# Patient Record
Sex: MALE | Race: WHITE | NOT HISPANIC OR LATINO | Employment: FULL TIME | URBAN - NONMETROPOLITAN AREA
[De-identification: names, ages, dates, MRNs, and addresses within clinical notes are randomized per-mention and may not be internally consistent; named-entity substitution may affect disease eponyms.]

---

## 2023-05-10 ENCOUNTER — OFFICE VISIT (OUTPATIENT)
Dept: FAMILY MEDICINE CLINIC | Age: 63
End: 2023-05-10
Payer: COMMERCIAL

## 2023-05-10 ENCOUNTER — LAB (OUTPATIENT)
Dept: LAB | Facility: HOSPITAL | Age: 63
End: 2023-05-10
Payer: COMMERCIAL

## 2023-05-10 VITALS
HEART RATE: 74 BPM | HEIGHT: 72 IN | BODY MASS INDEX: 34.67 KG/M2 | WEIGHT: 256 LBS | OXYGEN SATURATION: 97 % | DIASTOLIC BLOOD PRESSURE: 97 MMHG | SYSTOLIC BLOOD PRESSURE: 177 MMHG

## 2023-05-10 DIAGNOSIS — Z12.5 SCREENING FOR PROSTATE CANCER: ICD-10-CM

## 2023-05-10 DIAGNOSIS — F17.210 CIGARETTE SMOKER: ICD-10-CM

## 2023-05-10 DIAGNOSIS — E61.1 LOW SERUM IRON: ICD-10-CM

## 2023-05-10 DIAGNOSIS — Z11.59 ENCOUNTER FOR SCREENING FOR OTHER VIRAL DISEASES: ICD-10-CM

## 2023-05-10 DIAGNOSIS — R53.83 OTHER FATIGUE: ICD-10-CM

## 2023-05-10 DIAGNOSIS — Z87.891 HISTORY OF NICOTINE DEPENDENCE: ICD-10-CM

## 2023-05-10 DIAGNOSIS — Z12.11 COLON CANCER SCREENING: ICD-10-CM

## 2023-05-10 DIAGNOSIS — I10 ESSENTIAL HYPERTENSION: Primary | ICD-10-CM

## 2023-05-10 DIAGNOSIS — Z13.220 SCREENING FOR LIPOID DISORDERS: ICD-10-CM

## 2023-05-10 PROBLEM — M54.50 CHRONIC MIDLINE LOW BACK PAIN WITHOUT SCIATICA: Status: ACTIVE | Noted: 2023-05-10

## 2023-05-10 PROBLEM — G89.29 CHRONIC MIDLINE LOW BACK PAIN WITHOUT SCIATICA: Status: ACTIVE | Noted: 2023-05-10

## 2023-05-10 PROBLEM — Z90.01 HISTORY OF EYE REMOVAL: Status: ACTIVE | Noted: 2023-05-10

## 2023-05-10 LAB
ALBUMIN SERPL-MCNC: 4.3 G/DL (ref 3.5–5.2)
ALBUMIN/GLOB SERPL: 1.9 G/DL
ALP SERPL-CCNC: 71 U/L (ref 39–117)
ALT SERPL W P-5'-P-CCNC: 28 U/L (ref 1–41)
ANION GAP SERPL CALCULATED.3IONS-SCNC: 9.7 MMOL/L (ref 5–15)
AST SERPL-CCNC: 19 U/L (ref 1–40)
BASOPHILS # BLD AUTO: 0.06 10*3/MM3 (ref 0–0.2)
BASOPHILS NFR BLD AUTO: 0.6 % (ref 0–1.5)
BILIRUB SERPL-MCNC: 0.3 MG/DL (ref 0–1.2)
BUN SERPL-MCNC: 11 MG/DL (ref 8–23)
BUN/CREAT SERPL: 10.9 (ref 7–25)
CALCIUM SPEC-SCNC: 9.4 MG/DL (ref 8.6–10.5)
CHLORIDE SERPL-SCNC: 102 MMOL/L (ref 98–107)
CHOLEST SERPL-MCNC: 208 MG/DL (ref 0–200)
CO2 SERPL-SCNC: 25.3 MMOL/L (ref 22–29)
CREAT SERPL-MCNC: 1.01 MG/DL (ref 0.76–1.27)
DEPRECATED RDW RBC AUTO: 46.2 FL (ref 37–54)
EGFRCR SERPLBLD CKD-EPI 2021: 84.1 ML/MIN/1.73
EOSINOPHIL # BLD AUTO: 0.35 10*3/MM3 (ref 0–0.4)
EOSINOPHIL NFR BLD AUTO: 3.5 % (ref 0.3–6.2)
ERYTHROCYTE [DISTWIDTH] IN BLOOD BY AUTOMATED COUNT: 12.7 % (ref 12.3–15.4)
GLOBULIN UR ELPH-MCNC: 2.3 GM/DL
GLUCOSE SERPL-MCNC: 96 MG/DL (ref 65–99)
HCT VFR BLD AUTO: 46.5 % (ref 37.5–51)
HCV AB SER DONR QL: NORMAL
HDLC SERPL-MCNC: 50 MG/DL (ref 40–60)
HGB BLD-MCNC: 15.5 G/DL (ref 13–17.7)
IMM GRANULOCYTES # BLD AUTO: 0.02 10*3/MM3 (ref 0–0.05)
IMM GRANULOCYTES NFR BLD AUTO: 0.2 % (ref 0–0.5)
IRON 24H UR-MRATE: 67 MCG/DL (ref 59–158)
IRON SATN MFR SERPL: 15 % (ref 20–50)
LDLC SERPL CALC-MCNC: 132 MG/DL (ref 0–100)
LDLC/HDLC SERPL: 2.58 {RATIO}
LYMPHOCYTES # BLD AUTO: 3.01 10*3/MM3 (ref 0.7–3.1)
LYMPHOCYTES NFR BLD AUTO: 30.2 % (ref 19.6–45.3)
MCH RBC QN AUTO: 32.6 PG (ref 26.6–33)
MCHC RBC AUTO-ENTMCNC: 33.3 G/DL (ref 31.5–35.7)
MCV RBC AUTO: 97.7 FL (ref 79–97)
MONOCYTES # BLD AUTO: 0.66 10*3/MM3 (ref 0.1–0.9)
MONOCYTES NFR BLD AUTO: 6.6 % (ref 5–12)
NEUTROPHILS NFR BLD AUTO: 5.88 10*3/MM3 (ref 1.7–7)
NEUTROPHILS NFR BLD AUTO: 58.9 % (ref 42.7–76)
PLATELET # BLD AUTO: 187 10*3/MM3 (ref 140–450)
PMV BLD AUTO: 10.3 FL (ref 6–12)
POTASSIUM SERPL-SCNC: 4.2 MMOL/L (ref 3.5–5.2)
PROT SERPL-MCNC: 6.6 G/DL (ref 6–8.5)
PSA SERPL-MCNC: 3.16 NG/ML (ref 0–4)
RBC # BLD AUTO: 4.76 10*6/MM3 (ref 4.14–5.8)
SODIUM SERPL-SCNC: 137 MMOL/L (ref 136–145)
TESTOST SERPL-MCNC: 206 NG/DL (ref 193–740)
TIBC SERPL-MCNC: 453 MCG/DL (ref 298–536)
TRANSFERRIN SERPL-MCNC: 304 MG/DL (ref 200–360)
TRIGL SERPL-MCNC: 146 MG/DL (ref 0–150)
TSH SERPL DL<=0.05 MIU/L-ACNC: 2.59 UIU/ML (ref 0.27–4.2)
VLDLC SERPL-MCNC: 26 MG/DL (ref 5–40)
WBC NRBC COR # BLD: 9.98 10*3/MM3 (ref 3.4–10.8)

## 2023-05-10 PROCEDURE — 84403 ASSAY OF TOTAL TESTOSTERONE: CPT | Performed by: FAMILY MEDICINE

## 2023-05-10 PROCEDURE — 80061 LIPID PANEL: CPT | Performed by: FAMILY MEDICINE

## 2023-05-10 PROCEDURE — 83540 ASSAY OF IRON: CPT | Performed by: FAMILY MEDICINE

## 2023-05-10 PROCEDURE — 82607 VITAMIN B-12: CPT | Performed by: FAMILY MEDICINE

## 2023-05-10 PROCEDURE — 36415 COLL VENOUS BLD VENIPUNCTURE: CPT | Performed by: FAMILY MEDICINE

## 2023-05-10 PROCEDURE — G0103 PSA SCREENING: HCPCS | Performed by: FAMILY MEDICINE

## 2023-05-10 PROCEDURE — 99204 OFFICE O/P NEW MOD 45 MIN: CPT | Performed by: FAMILY MEDICINE

## 2023-05-10 PROCEDURE — 86803 HEPATITIS C AB TEST: CPT | Performed by: FAMILY MEDICINE

## 2023-05-10 PROCEDURE — 82746 ASSAY OF FOLIC ACID SERUM: CPT | Performed by: FAMILY MEDICINE

## 2023-05-10 PROCEDURE — 84466 ASSAY OF TRANSFERRIN: CPT | Performed by: FAMILY MEDICINE

## 2023-05-10 PROCEDURE — 80050 GENERAL HEALTH PANEL: CPT | Performed by: FAMILY MEDICINE

## 2023-05-10 NOTE — PROGRESS NOTES
"Chief Complaint  Establish Care and Fatigue (Requesting blood work)    Subjective          Marquise Murphy presents to [facility]  History of Present Illness  NEW PATIENT:  --BP HAS BEEN UP AND DOWN BUT DOES NOT CHECK IT REGULARLY, HAS NEVER BEEN ON BP MEDS BUT ALSO HAD NOT SEEN A DOCTOR FOR A WHILE  --ISSUES WITH FATIGUE FOR THE PAST FEW MONTHS DESPITE SLEEPING WELL, HAS NOT BEEN TOLD THAT HE SNORES.  IRON WAS LOW AT ONE TIME  --DUE FOR ROUTINE LABS, A COLONOSCOPY AND A SCREENING CHEST CT        No Known Allergies     Health Maintenance Due   Topic Date Due   • COLORECTAL CANCER SCREENING  Never done   • Pneumococcal Vaccine 0-64 (1 - PCV) Never done   • LUNG CANCER SCREENING  Never done   • HEPATITIS C SCREENING  Never done   • ANNUAL PHYSICAL  Never done        No current outpatient medications on file prior to visit.     No current facility-administered medications on file prior to visit.       Immunization History   Administered Date(s) Administered   • COVID-19 (MODERNA) 1st,2nd,3rd Dose Monovalent 07/30/2021, 08/27/2021       Review of Systems   Constitutional: Positive for fatigue. Negative for activity change, appetite change, chills and fever.   HENT: Negative for congestion, ear pain, rhinorrhea and sore throat.    Respiratory: Negative for cough and shortness of breath.    Cardiovascular: Negative for chest pain, palpitations and leg swelling.   Gastrointestinal: Negative for abdominal pain, constipation, diarrhea, nausea and vomiting.   Musculoskeletal: Positive for arthralgias. Negative for myalgias.   Neurological: Negative for headache.        Objective     /97 (BP Location: Left arm, Patient Position: Sitting)   Pulse 74   Ht 182.9 cm (72\")   Wt 116 kg (256 lb)   SpO2 97% Comment: on room air  BMI 34.72 kg/m²       Physical Exam  Vitals and nursing note reviewed.   Constitutional:       General: He is not in acute distress.     Appearance: Normal " appearance.   Cardiovascular:      Rate and Rhythm: Normal rate and regular rhythm.      Heart sounds: Normal heart sounds. No murmur heard.  Pulmonary:      Effort: Pulmonary effort is normal.      Breath sounds: Normal breath sounds.   Abdominal:      Palpations: Abdomen is soft.      Tenderness: There is no abdominal tenderness.   Musculoskeletal:      Cervical back: Neck supple.      Right lower leg: No edema.      Left lower leg: No edema.   Lymphadenopathy:      Cervical: No cervical adenopathy.   Neurological:      General: No focal deficit present.      Mental Status: He is alert.      Cranial Nerves: No cranial nerve deficit.      Coordination: Coordination normal.      Gait: Gait normal.   Psychiatric:         Mood and Affect: Mood normal.         Behavior: Behavior normal.         Result Review :                             Assessment and Plan      Diagnoses and all orders for this visit:    1. Essential hypertension (Primary)  Assessment & Plan:  Hypertension is newly identified.  Stop smoking.  Continue current medications.  Blood pressure will be reassessed at the next regular appointment   HE WILL START MONITORING AT HOME, CONSIDER STARTING MEDS, WILL REEVALUATE AT NEXT VISIT .    Orders:  -     CBC & Differential  -     Comprehensive Metabolic Panel  -     TSH Rfx On Abnormal To Free T4    2. Screening for lipoid disorders  -     Lipid Panel    3. Encounter for screening for other viral diseases  -     Hepatitis C Antibody    4. History of nicotine dependence    5. Screening for prostate cancer  -     PSA Screen    6. Colon cancer screening  -     Ambulatory Referral to General Surgery    7. Cigarette smoker  -     CT Chest Low Dose Wo; Future    8. Low serum iron  -     Iron Profile    9. Other fatigue  Assessment & Plan:  MANY POSSIBILITIES, WILL OBTAIN LABS AS NOTED AND PROCEED ACCORDINGLY.  CONSIDER SLEEP STUDY     Orders:  -     Vitamin B12 & Folate  -     Testosterone          Follow Up      Return in about 3 months (around 8/10/2023).    Patient was given instructions and counseling regarding his condition or for health maintenance advice. Please see specific information pulled into the AVS if appropriate.

## 2023-05-10 NOTE — ASSESSMENT & PLAN NOTE
Hypertension is newly identified.  Stop smoking.  Continue current medications.  Blood pressure will be reassessed at the next regular appointment   HE WILL START MONITORING AT HOME, CONSIDER STARTING MEDS, WILL REEVALUATE AT NEXT VISIT .

## 2023-05-11 ENCOUNTER — DOCUMENTATION (OUTPATIENT)
Dept: FAMILY MEDICINE CLINIC | Age: 63
End: 2023-05-11
Payer: COMMERCIAL

## 2023-05-11 LAB
FOLATE SERPL-MCNC: 15.7 NG/ML (ref 4.78–24.2)
VIT B12 BLD-MCNC: 318 PG/ML (ref 211–946)

## 2023-05-11 RX ORDER — FERROUS SULFATE 325(65) MG
325 TABLET ORAL
Qty: 90 TABLET | Refills: 1 | Status: SHIPPED | OUTPATIENT
Start: 2023-05-11

## 2023-05-19 ENCOUNTER — HOSPITAL ENCOUNTER (OUTPATIENT)
Dept: CT IMAGING | Facility: HOSPITAL | Age: 63
Discharge: HOME | End: 2023-05-19
Payer: COMMERCIAL

## 2023-05-19 DIAGNOSIS — F17.210 CIGARETTE SMOKER: ICD-10-CM

## 2023-05-19 PROCEDURE — 71271 CT THORAX LUNG CANCER SCR C-: CPT

## 2023-06-20 PROBLEM — Z12.11 COLON CANCER SCREENING: Status: ACTIVE | Noted: 2023-06-20

## 2023-08-14 ENCOUNTER — LAB (OUTPATIENT)
Dept: LAB | Facility: HOSPITAL | Age: 63
End: 2023-08-14
Payer: COMMERCIAL

## 2023-08-14 ENCOUNTER — OFFICE VISIT (OUTPATIENT)
Dept: FAMILY MEDICINE CLINIC | Age: 63
End: 2023-08-14
Payer: COMMERCIAL

## 2023-08-14 ENCOUNTER — TELEPHONE (OUTPATIENT)
Dept: FAMILY MEDICINE CLINIC | Age: 63
End: 2023-08-14

## 2023-08-14 VITALS
DIASTOLIC BLOOD PRESSURE: 97 MMHG | WEIGHT: 252.2 LBS | SYSTOLIC BLOOD PRESSURE: 160 MMHG | HEART RATE: 77 BPM | BODY MASS INDEX: 34.16 KG/M2 | HEIGHT: 72 IN

## 2023-08-14 DIAGNOSIS — E78.00 ELEVATED CHOLESTEROL: ICD-10-CM

## 2023-08-14 DIAGNOSIS — E61.1 LOW SERUM IRON: ICD-10-CM

## 2023-08-14 DIAGNOSIS — Z00.00 ANNUAL PHYSICAL EXAM: Primary | ICD-10-CM

## 2023-08-14 DIAGNOSIS — I10 ESSENTIAL HYPERTENSION: ICD-10-CM

## 2023-08-14 PROBLEM — Z12.11 COLON CANCER SCREENING: Status: RESOLVED | Noted: 2023-06-20 | Resolved: 2023-08-14

## 2023-08-14 PROBLEM — R53.83 OTHER FATIGUE: Status: RESOLVED | Noted: 2023-05-10 | Resolved: 2023-08-14

## 2023-08-14 LAB
ALBUMIN SERPL-MCNC: 4.5 G/DL (ref 3.5–5.2)
ALBUMIN/GLOB SERPL: 2.4 G/DL
ALP SERPL-CCNC: 65 U/L (ref 39–117)
ALT SERPL W P-5'-P-CCNC: 32 U/L (ref 1–41)
ANION GAP SERPL CALCULATED.3IONS-SCNC: 10 MMOL/L (ref 5–15)
AST SERPL-CCNC: 22 U/L (ref 1–40)
BILIRUB SERPL-MCNC: 0.3 MG/DL (ref 0–1.2)
BUN SERPL-MCNC: 18 MG/DL (ref 8–23)
BUN/CREAT SERPL: 18.8 (ref 7–25)
CALCIUM SPEC-SCNC: 9.2 MG/DL (ref 8.6–10.5)
CHLORIDE SERPL-SCNC: 103 MMOL/L (ref 98–107)
CHOLEST SERPL-MCNC: 224 MG/DL (ref 0–200)
CO2 SERPL-SCNC: 25 MMOL/L (ref 22–29)
CREAT SERPL-MCNC: 0.96 MG/DL (ref 0.76–1.27)
DEPRECATED RDW RBC AUTO: 47.3 FL (ref 37–54)
EGFRCR SERPLBLD CKD-EPI 2021: 89.4 ML/MIN/1.73
ERYTHROCYTE [DISTWIDTH] IN BLOOD BY AUTOMATED COUNT: 13.1 % (ref 12.3–15.4)
GLOBULIN UR ELPH-MCNC: 1.9 GM/DL
GLUCOSE SERPL-MCNC: 189 MG/DL (ref 65–99)
HCT VFR BLD AUTO: 45.7 % (ref 37.5–51)
HDLC SERPL-MCNC: 43 MG/DL (ref 40–60)
HGB BLD-MCNC: 15.5 G/DL (ref 13–17.7)
IRON 24H UR-MRATE: 142 MCG/DL (ref 59–158)
IRON SATN MFR SERPL: 37 % (ref 20–50)
LDLC SERPL CALC-MCNC: 137 MG/DL (ref 0–100)
LDLC/HDLC SERPL: 3.08 {RATIO}
MCH RBC QN AUTO: 32.6 PG (ref 26.6–33)
MCHC RBC AUTO-ENTMCNC: 33.9 G/DL (ref 31.5–35.7)
MCV RBC AUTO: 96 FL (ref 79–97)
PLATELET # BLD AUTO: 190 10*3/MM3 (ref 140–450)
PMV BLD AUTO: 11.1 FL (ref 6–12)
POTASSIUM SERPL-SCNC: 4.2 MMOL/L (ref 3.5–5.2)
PROT SERPL-MCNC: 6.4 G/DL (ref 6–8.5)
RBC # BLD AUTO: 4.76 10*6/MM3 (ref 4.14–5.8)
SODIUM SERPL-SCNC: 138 MMOL/L (ref 136–145)
TIBC SERPL-MCNC: 380 MCG/DL (ref 298–536)
TRANSFERRIN SERPL-MCNC: 255 MG/DL (ref 200–360)
TRIGL SERPL-MCNC: 243 MG/DL (ref 0–150)
VLDLC SERPL-MCNC: 44 MG/DL (ref 5–40)
WBC NRBC COR # BLD: 7.98 10*3/MM3 (ref 3.4–10.8)

## 2023-08-14 PROCEDURE — 80061 LIPID PANEL: CPT | Performed by: FAMILY MEDICINE

## 2023-08-14 PROCEDURE — 84466 ASSAY OF TRANSFERRIN: CPT | Performed by: FAMILY MEDICINE

## 2023-08-14 PROCEDURE — 80053 COMPREHEN METABOLIC PANEL: CPT | Performed by: FAMILY MEDICINE

## 2023-08-14 PROCEDURE — 99396 PREV VISIT EST AGE 40-64: CPT | Performed by: FAMILY MEDICINE

## 2023-08-14 PROCEDURE — 85027 COMPLETE CBC AUTOMATED: CPT | Performed by: FAMILY MEDICINE

## 2023-08-14 PROCEDURE — 99213 OFFICE O/P EST LOW 20 MIN: CPT | Performed by: FAMILY MEDICINE

## 2023-08-14 PROCEDURE — 36415 COLL VENOUS BLD VENIPUNCTURE: CPT | Performed by: FAMILY MEDICINE

## 2023-08-14 PROCEDURE — 83540 ASSAY OF IRON: CPT | Performed by: FAMILY MEDICINE

## 2023-08-14 RX ORDER — LISINOPRIL 10 MG/1
10 TABLET ORAL DAILY
Qty: 90 TABLET | Refills: 1 | Status: SHIPPED | OUTPATIENT
Start: 2023-08-14 | End: 2024-02-10

## 2023-08-14 NOTE — PROGRESS NOTES
Chief Complaint  Hypertension (3 months)    Subjective          Marquise Murphy presents to [facility]  History of Present Illness  --ANNUAL EXAM, LAST EXAM UNKNOWN, CONTINUES TO SMOKE, WILL HAVE A COLONOSCOPY SOON  --BP HAS REMAINED ELEVATED, BORDERLINE READINGS AT HOME  --CHOL WAS > 200, RECEIVED DIETARY ADVICE, DUE FOR A RECHECK  --IRON SAT WAS LOW, TOOK SUPPLEMENT FOR A FEW WEEKS THAN STOPPED AS HE SAW NO DIFFERENCE IN HIS LEVEL OF ENERGY      No Known Allergies     Health Maintenance Due   Topic Date Due    COLORECTAL CANCER SCREENING  Never done    Pneumococcal Vaccine 0-64 (1 - PCV) Never done    ZOSTER VACCINE (1 of 2) Never done    COVID-19 Vaccine (3 - Moderna series) 10/22/2021    ANNUAL PHYSICAL  Never done        Current Outpatient Medications on File Prior to Visit   Medication Sig    [DISCONTINUED] ferrous sulfate (FerrouSul) 325 (65 FE) MG tablet Take 1 tablet by mouth Daily With Breakfast. (Patient not taking: Reported on 8/14/2023)     No current facility-administered medications on file prior to visit.       Immunization History   Administered Date(s) Administered    COVID-19 (MODERNA) 1st,2nd,3rd Dose Monovalent 07/30/2021, 08/27/2021       Review of Systems   Constitutional:  Positive for fatigue. Negative for activity change, appetite change, chills and fever.   HENT:  Negative for congestion, ear pain, hearing loss, rhinorrhea and sore throat.    Eyes:  Negative for blurred vision and discharge.   Respiratory:  Negative for cough and shortness of breath.    Cardiovascular:  Negative for chest pain, palpitations and leg swelling.   Gastrointestinal:  Negative for abdominal pain, constipation, diarrhea, nausea and vomiting.   Genitourinary:  Negative for dysuria and hematuria.   Musculoskeletal:  Negative for arthralgias and myalgias.   Neurological:  Negative for headache.   Psychiatric/Behavioral:  Negative for depressed mood.       Objective     /97 (BP  "Location: Left arm, Patient Position: Sitting)   Pulse 77   Ht 182.9 cm (72\")   Wt 114 kg (252 lb 3.2 oz)   BMI 34.20 kg/mý       Physical Exam  Vitals and nursing note reviewed.   Constitutional:       General: He is not in acute distress.     Appearance: Normal appearance.   HENT:      Right Ear: Tympanic membrane normal.      Left Ear: Tympanic membrane normal.      Mouth/Throat:      Pharynx: Oropharynx is clear.   Eyes:      Conjunctiva/sclera: Conjunctivae normal.   Cardiovascular:      Rate and Rhythm: Normal rate and regular rhythm.      Heart sounds: Normal heart sounds. No murmur heard.  Pulmonary:      Effort: Pulmonary effort is normal.      Breath sounds: Normal breath sounds.   Abdominal:      General: Bowel sounds are normal.      Palpations: Abdomen is soft.      Tenderness: There is no abdominal tenderness.   Musculoskeletal:      Cervical back: Neck supple.      Right lower leg: No edema.      Left lower leg: No edema.   Lymphadenopathy:      Cervical: No cervical adenopathy.   Neurological:      General: No focal deficit present.      Mental Status: He is alert.      Cranial Nerves: No cranial nerve deficit.      Coordination: Coordination normal.      Gait: Gait normal.   Psychiatric:         Mood and Affect: Mood normal.         Behavior: Behavior normal.       Result Review :                             Assessment and Plan      Diagnoses and all orders for this visit:    1. Annual physical exam (Primary)  Assessment & Plan:  ADVICE GIVEN RE:  SEATBELT USE, ALCOHOL USE, HEALTHY DIET, ROUTINE EYE AND DENTAL EXAM, ROUTINE VACCINATIONS.    WILL HAVE COLONOSCOPY SOON       2. Elevated cholesterol  Assessment & Plan:  Lipid abnormalities are newly identified.  Nutritional counseling was provided.  Lipids will be reassessed in 6 months.    Orders:  -     Lipid Panel    3. Low serum iron  Assessment & Plan:  WILL REPEAT LABS AND ASK THE GENERAL SURGEON TO PERFORM AN EGD WHEN HE HAS THE COLONOSCOPY "     Orders:  -     CBC (No Diff)  -     Iron Profile    4. Essential hypertension  Assessment & Plan:  Hypertension is newly identified.  Weight loss.  Regular aerobic exercise.  Stop smoking.  Medication changes per orders.  Blood pressure will be reassessed at the next regular appointment  WILL START AN ACEI AND ADVISE CONTINUED HOME MONITORING .    Orders:  -     Comprehensive Metabolic Panel  -     lisinopril (PRINIVIL,ZESTRIL) 10 MG tablet; Take 1 tablet by mouth Daily for 180 days.  Dispense: 90 tablet; Refill: 1            Follow Up     Return in about 6 months (around 2/14/2024).    Patient was given instructions and counseling regarding his condition or for health maintenance advice. Please see specific information pulled into the AVS if appropriate.

## 2023-08-14 NOTE — TELEPHONE ENCOUNTER
----- Message from Kip Mena MD sent at 8/14/2023  8:36 AM EDT -----  PLEASE CALL THIS MAN IN A MONTH TO SEE HOW HIS BP IS DOING

## 2023-08-14 NOTE — ASSESSMENT & PLAN NOTE
Hypertension is newly identified.  Weight loss.  Regular aerobic exercise.  Stop smoking.  Medication changes per orders.  Blood pressure will be reassessed at the next regular appointment  WILL START AN ACEI AND ADVISE CONTINUED HOME MONITORING .

## 2023-08-14 NOTE — ASSESSMENT & PLAN NOTE
ADVICE GIVEN RE:  SEATBELT USE, ALCOHOL USE, HEALTHY DIET, ROUTINE EYE AND DENTAL EXAM, ROUTINE VACCINATIONS.    WILL HAVE COLONOSCOPY SOON

## 2023-08-15 DIAGNOSIS — R73.9 BLOOD GLUCOSE ELEVATED: ICD-10-CM

## 2023-08-15 DIAGNOSIS — E78.00 ELEVATED CHOLESTEROL: Primary | ICD-10-CM

## 2023-08-30 ENCOUNTER — TELEPHONE (OUTPATIENT)
Dept: FAMILY MEDICINE CLINIC | Age: 63
End: 2023-08-30

## 2023-08-30 ENCOUNTER — TELEPHONE (OUTPATIENT)
Dept: SURGERY | Facility: CLINIC | Age: 63
End: 2023-08-30
Payer: COMMERCIAL

## 2023-08-30 NOTE — TELEPHONE ENCOUNTER
"  Caller: Marquise Murphy \"Vega\"    Relationship: Self    Best call back number: [phone]     What is the best time to reach you: ANYTIME BEFORE 5PM    Who are you requesting to speak with (clinical staff, provider,  specific staff member): CLINICAL    What was the call regarding: PATIENT CALLED TO LET DR. LEON KNOW THAT HE CANCELLED HIS COLONOSCOPY FOR THE END OF NOVEMBER IN [facility] WHERE HE WAS REFERRED AND RESCHEDULED IT AT [facility] FOR THE END OF SEPTEMBER. PATIENT STATED THAT HE NEEDED TO BE CLOSER TO HOME SO THAT SOMEONE COULD TAKE HIM TO THE APPOINTMENT AND BRING HIM HOME AFTER THE COLONOSCOPY.     Is it okay if the provider responds through MyChart: YES  "

## 2023-09-14 NOTE — TELEPHONE ENCOUNTER
Pt said he is using a wrist b/p monitor.  Before he started the b/p med is b/p was running 149/85 and after medication 130/80. He has also quit smoking and is doing better.   He will stop by today and get his b/p checked here to get a idea if his wrist monitor is acuate or not.

## 2023-09-18 ENCOUNTER — CLINICAL SUPPORT (OUTPATIENT)
Dept: FAMILY MEDICINE CLINIC | Age: 63
End: 2023-09-18
Payer: COMMERCIAL

## 2023-09-18 VITALS — SYSTOLIC BLOOD PRESSURE: 122 MMHG | DIASTOLIC BLOOD PRESSURE: 78 MMHG | HEART RATE: 76 BPM

## 2023-09-18 NOTE — PROGRESS NOTES
Patient here for blood pressure check, brought in wrist cuff is 5 points off. No complains of chest pain. Kp

## 2023-11-14 ENCOUNTER — TELEPHONE (OUTPATIENT)
Dept: FAMILY MEDICINE CLINIC | Age: 63
End: 2023-11-14
Payer: COMMERCIAL

## 2024-01-02 ENCOUNTER — LAB (OUTPATIENT)
Dept: LAB | Facility: HOSPITAL | Age: 64
End: 2024-01-02
Payer: COMMERCIAL

## 2024-01-02 DIAGNOSIS — E78.00 ELEVATED CHOLESTEROL: ICD-10-CM

## 2024-01-02 DIAGNOSIS — R73.9 BLOOD GLUCOSE ELEVATED: ICD-10-CM

## 2024-01-02 LAB
ALBUMIN SERPL-MCNC: 4.7 G/DL (ref 3.5–5.2)
ALBUMIN/GLOB SERPL: 2.4 G/DL
ALP SERPL-CCNC: 68 U/L (ref 39–117)
ALT SERPL W P-5'-P-CCNC: 26 U/L (ref 1–41)
ANION GAP SERPL CALCULATED.3IONS-SCNC: 12 MMOL/L (ref 5–15)
AST SERPL-CCNC: 18 U/L (ref 1–40)
BILIRUB SERPL-MCNC: 0.4 MG/DL (ref 0–1.2)
BUN SERPL-MCNC: 18 MG/DL (ref 8–23)
BUN/CREAT SERPL: 16.8 (ref 7–25)
CALCIUM SPEC-SCNC: 9.4 MG/DL (ref 8.6–10.5)
CHLORIDE SERPL-SCNC: 101 MMOL/L (ref 98–107)
CHOLEST SERPL-MCNC: 261 MG/DL (ref 0–200)
CO2 SERPL-SCNC: 24 MMOL/L (ref 22–29)
CREAT SERPL-MCNC: 1.07 MG/DL (ref 0.76–1.27)
EGFRCR SERPLBLD CKD-EPI 2021: 78 ML/MIN/1.73
GLOBULIN UR ELPH-MCNC: 2 GM/DL
GLUCOSE SERPL-MCNC: 116 MG/DL (ref 65–99)
HBA1C MFR BLD: 6.7 % (ref 4.8–5.6)
HDLC SERPL-MCNC: 40 MG/DL (ref 40–60)
LDLC SERPL CALC-MCNC: 184 MG/DL (ref 0–100)
LDLC/HDLC SERPL: 4.54 {RATIO}
POTASSIUM SERPL-SCNC: 4.6 MMOL/L (ref 3.5–5.2)
PROT SERPL-MCNC: 6.7 G/DL (ref 6–8.5)
SODIUM SERPL-SCNC: 137 MMOL/L (ref 136–145)
TRIGL SERPL-MCNC: 198 MG/DL (ref 0–150)
VLDLC SERPL-MCNC: 37 MG/DL (ref 5–40)

## 2024-01-02 PROCEDURE — 80053 COMPREHEN METABOLIC PANEL: CPT

## 2024-01-02 PROCEDURE — 83036 HEMOGLOBIN GLYCOSYLATED A1C: CPT

## 2024-01-02 PROCEDURE — 36415 COLL VENOUS BLD VENIPUNCTURE: CPT

## 2024-01-02 PROCEDURE — 80061 LIPID PANEL: CPT

## 2024-01-03 DIAGNOSIS — I10 ESSENTIAL HYPERTENSION: Primary | ICD-10-CM

## 2024-01-03 DIAGNOSIS — R73.9 ELEVATED BLOOD SUGAR: ICD-10-CM

## 2024-01-03 DIAGNOSIS — E78.00 ELEVATED CHOLESTEROL: ICD-10-CM

## 2024-01-03 RX ORDER — ATORVASTATIN CALCIUM 20 MG/1
20 TABLET, FILM COATED ORAL DAILY
Qty: 90 TABLET | Refills: 1 | Status: SHIPPED | OUTPATIENT
Start: 2024-01-03

## 2024-02-14 ENCOUNTER — OFFICE VISIT (OUTPATIENT)
Dept: FAMILY MEDICINE CLINIC | Age: 64
End: 2024-02-14
Payer: COMMERCIAL

## 2024-02-14 VITALS
DIASTOLIC BLOOD PRESSURE: 87 MMHG | HEIGHT: 72 IN | WEIGHT: 247.8 LBS | HEART RATE: 86 BPM | SYSTOLIC BLOOD PRESSURE: 147 MMHG | TEMPERATURE: 97.5 F | BODY MASS INDEX: 33.56 KG/M2

## 2024-02-14 DIAGNOSIS — Z86.010 HISTORY OF COLON POLYPS: ICD-10-CM

## 2024-02-14 DIAGNOSIS — I10 ESSENTIAL HYPERTENSION: Primary | ICD-10-CM

## 2024-02-14 DIAGNOSIS — E78.00 HIGH CHOLESTEROL: ICD-10-CM

## 2024-02-14 DIAGNOSIS — Z86.39 HISTORY OF IRON DEFICIENCY: ICD-10-CM

## 2024-02-14 PROBLEM — Z80.0 FAMILY HISTORY OF MALIGNANT NEOPLASM OF COLON IN RELATIVE DIAGNOSED WHEN OLDER THAN 50 YEARS OF AGE: Status: ACTIVE | Noted: 2024-02-14

## 2024-02-14 PROBLEM — Z00.00 ANNUAL PHYSICAL EXAM: Status: RESOLVED | Noted: 2023-08-14 | Resolved: 2024-02-14

## 2024-02-14 PROBLEM — Z80.0 FAMILY HISTORY OF MALIGNANT NEOPLASM OF COLON IN RELATIVE DIAGNOSED WHEN OLDER THAN 50 YEARS OF AGE: Status: RESOLVED | Noted: 2024-02-14 | Resolved: 2024-02-14

## 2024-02-14 PROBLEM — Z86.0100 HISTORY OF COLON POLYPS: Status: ACTIVE | Noted: 2024-02-14

## 2024-02-14 PROCEDURE — 99214 OFFICE O/P EST MOD 30 MIN: CPT | Performed by: FAMILY MEDICINE

## 2024-03-07 ENCOUNTER — CLINICAL SUPPORT (OUTPATIENT)
Dept: FAMILY MEDICINE CLINIC | Age: 64
End: 2024-03-07
Payer: COMMERCIAL

## 2024-03-07 VITALS — HEART RATE: 87 BPM | DIASTOLIC BLOOD PRESSURE: 86 MMHG | SYSTOLIC BLOOD PRESSURE: 136 MMHG

## 2024-03-08 ENCOUNTER — OFFICE VISIT (OUTPATIENT)
Dept: FAMILY MEDICINE CLINIC | Age: 64
End: 2024-03-08
Payer: COMMERCIAL

## 2024-03-08 VITALS
OXYGEN SATURATION: 97 % | HEIGHT: 72 IN | WEIGHT: 251.4 LBS | HEART RATE: 88 BPM | SYSTOLIC BLOOD PRESSURE: 152 MMHG | DIASTOLIC BLOOD PRESSURE: 76 MMHG | BODY MASS INDEX: 34.05 KG/M2 | TEMPERATURE: 97.8 F

## 2024-03-08 DIAGNOSIS — I10 ESSENTIAL HYPERTENSION: ICD-10-CM

## 2024-03-08 DIAGNOSIS — R55 NEAR SYNCOPE: Primary | ICD-10-CM

## 2024-03-08 DIAGNOSIS — H91.93 HEARING DIFFICULTY OF BOTH EARS: ICD-10-CM

## 2024-03-08 PROBLEM — R73.03 PREDIABETES: Status: ACTIVE | Noted: 2024-03-08

## 2024-03-08 PROCEDURE — 99214 OFFICE O/P EST MOD 30 MIN: CPT | Performed by: FAMILY MEDICINE

## 2024-03-08 RX ORDER — LISINOPRIL 10 MG/1
10 TABLET ORAL DAILY
Qty: 90 TABLET | Refills: 3 | Status: SHIPPED | OUTPATIENT
Start: 2024-03-08

## 2024-03-08 NOTE — ASSESSMENT & PLAN NOTE
PROBABLY SIMPLE ORTHOSTASIS VS. VERTIGO BUT, GIVEN HIS PRIOR INJURIES AND HEARING DIFFICULTIES WILL SEND TO ENT AND GET AN MRI.  CONSIDER ECHO, HOLTER, CAROTID DOPPLER AND EEG
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
## 2024-03-13 DIAGNOSIS — H91.93 HEARING DIFFICULTY OF BOTH EARS: Primary | ICD-10-CM

## 2024-03-21 ENCOUNTER — PROCEDURE VISIT (OUTPATIENT)
Dept: OTOLARYNGOLOGY | Facility: CLINIC | Age: 64
End: 2024-03-21
Payer: COMMERCIAL

## 2024-03-21 DIAGNOSIS — H90.A22 SENSORINEURAL HEARING LOSS (SNHL) OF LEFT EAR WITH RESTRICTED HEARING OF RIGHT EAR: ICD-10-CM

## 2024-03-21 DIAGNOSIS — H93.299 IMPAIRMENT OF SPEECH DISCRIMINATION: ICD-10-CM

## 2024-03-21 DIAGNOSIS — H90.A21 SENSORINEURAL HEARING LOSS (SNHL) OF RIGHT EAR WITH RESTRICTED HEARING OF LEFT EAR: Primary | ICD-10-CM

## 2024-03-21 DIAGNOSIS — H93.13 TINNITUS, BILATERAL: ICD-10-CM

## 2024-03-21 DIAGNOSIS — H91.93 HEARING DIFFICULTY OF BOTH EARS: ICD-10-CM

## 2024-03-21 PROCEDURE — 92557 COMPREHENSIVE HEARING TEST: CPT | Performed by: AUDIOLOGIST

## 2024-03-21 PROCEDURE — 92567 TYMPANOMETRY: CPT | Performed by: AUDIOLOGIST

## 2024-03-21 NOTE — PROGRESS NOTES
AUDIOMETRIC EVALUATION      Name:  Marquise Murphy  :  1960  Age:  63 y.o.  Date of Evaluation:  2024       History:  Mr. Murphy is seen today for a hearing evaluation due to hearing loss .    Audiologic Information:  Concerns for Hearing: Yes  PETs: No  Other otologic surgical history: None  Aural Pressure/Fullness: Periodically  Otalgia: No  Otorrhea: None  Tinnitus: Yes each ear  Dizziness: Yes  Noise Exposure: Yes  Family history of hearing loss: No  Head trauma requiring hospital stay: None  Chemotherapy: No  Other significant history: No    EVALUATION:    See audiogram    RESULTS:    Otoscopic Evaluation:  Right: Unremarkable for audio testing  Left: Unremarkable for audio testing     Tympanometry (226 Hz):  Right: Type Ad  Left: Type Ad    IMPRESSIONS:  Pure tone thresholds for the right ear indicates a mild sloping to severe sensorineural hearing loss.25K to 4K hertz.  Profound loss at 8K hertz.  Very poor word recognition.  Pure tone thresholds for the left ear indicates a mild and moderate mixed hearing loss.  Fair word recognition.  Patient was counseled with regard to the findings.    Amplification needs: May benefit from hearing instruments following medical management    RECOMMENDATIONS/PLAN:  Follow-up with PCP regarding MRI.  MRI of the right internal auditory canal.  Follow-up with the ENT when necessary  Discussed results and recommendations with patient.         Chapito Flores M.S, [facility]  Licensed Audiologist

## 2024-05-01 DIAGNOSIS — R55 NEAR SYNCOPE: ICD-10-CM

## 2024-05-02 ENCOUNTER — TELEPHONE (OUTPATIENT)
Dept: FAMILY MEDICINE CLINIC | Age: 64
End: 2024-05-02
Payer: COMMERCIAL

## 2024-05-02 NOTE — TELEPHONE ENCOUNTER
[employer] message sent regarding over due labs ord by pcp 1/3/24 due 4/2/24-1st attempt

## 2024-05-04 ENCOUNTER — DOCUMENTATION (OUTPATIENT)
Dept: FAMILY MEDICINE CLINIC | Age: 64
End: 2024-05-04
Payer: COMMERCIAL

## 2024-05-04 DIAGNOSIS — H91.93 HEARING DIFFICULTY OF BOTH EARS: Primary | ICD-10-CM

## 2024-05-04 DIAGNOSIS — R55 NEAR SYNCOPE: ICD-10-CM

## 2024-05-10 ENCOUNTER — LAB (OUTPATIENT)
Dept: LAB | Facility: HOSPITAL | Age: 64
End: 2024-05-10
Payer: COMMERCIAL

## 2024-05-10 DIAGNOSIS — E78.00 ELEVATED CHOLESTEROL: ICD-10-CM

## 2024-05-10 DIAGNOSIS — R73.9 ELEVATED BLOOD SUGAR: ICD-10-CM

## 2024-05-10 DIAGNOSIS — I10 ESSENTIAL HYPERTENSION: ICD-10-CM

## 2024-05-10 LAB
ALBUMIN SERPL-MCNC: 4.1 G/DL (ref 3.5–5.2)
ALBUMIN/GLOB SERPL: 1.9 G/DL
ALP SERPL-CCNC: 65 U/L (ref 39–117)
ALT SERPL W P-5'-P-CCNC: 15 U/L (ref 1–41)
ANION GAP SERPL CALCULATED.3IONS-SCNC: 8.8 MMOL/L (ref 5–15)
AST SERPL-CCNC: 15 U/L (ref 1–40)
BILIRUB SERPL-MCNC: 0.4 MG/DL (ref 0–1.2)
BUN SERPL-MCNC: 16 MG/DL (ref 8–23)
BUN/CREAT SERPL: 16.3 (ref 7–25)
CALCIUM SPEC-SCNC: 9.1 MG/DL (ref 8.6–10.5)
CHLORIDE SERPL-SCNC: 102 MMOL/L (ref 98–107)
CHOLEST SERPL-MCNC: 147 MG/DL (ref 0–200)
CO2 SERPL-SCNC: 25.2 MMOL/L (ref 22–29)
CREAT SERPL-MCNC: 0.98 MG/DL (ref 0.76–1.27)
EGFRCR SERPLBLD CKD-EPI 2021: 86.6 ML/MIN/1.73
GLOBULIN UR ELPH-MCNC: 2.2 GM/DL
GLUCOSE SERPL-MCNC: 114 MG/DL (ref 65–99)
HBA1C MFR BLD: 6.3 % (ref 4.8–5.6)
HDLC SERPL-MCNC: 41 MG/DL (ref 40–60)
LDLC SERPL CALC-MCNC: 89 MG/DL (ref 0–100)
LDLC/HDLC SERPL: 2.14 {RATIO}
POTASSIUM SERPL-SCNC: 4.3 MMOL/L (ref 3.5–5.2)
PROT SERPL-MCNC: 6.3 G/DL (ref 6–8.5)
SODIUM SERPL-SCNC: 136 MMOL/L (ref 136–145)
TRIGL SERPL-MCNC: 92 MG/DL (ref 0–150)
VLDLC SERPL-MCNC: 17 MG/DL (ref 5–40)

## 2024-05-10 PROCEDURE — 36415 COLL VENOUS BLD VENIPUNCTURE: CPT

## 2024-05-10 PROCEDURE — 83036 HEMOGLOBIN GLYCOSYLATED A1C: CPT

## 2024-05-10 PROCEDURE — 80061 LIPID PANEL: CPT

## 2024-05-10 PROCEDURE — 80053 COMPREHEN METABOLIC PANEL: CPT

## 2024-05-16 ENCOUNTER — HOSPITAL ENCOUNTER (OUTPATIENT)
Dept: CT IMAGING | Facility: HOSPITAL | Age: 64
Discharge: HOME | End: 2024-05-16
Admitting: FAMILY MEDICINE
Payer: COMMERCIAL

## 2024-05-16 DIAGNOSIS — R55 NEAR SYNCOPE: ICD-10-CM

## 2024-05-16 DIAGNOSIS — H91.93 HEARING DIFFICULTY OF BOTH EARS: ICD-10-CM

## 2024-05-16 PROCEDURE — 25510000001 IOPAMIDOL PER 1 ML: Performed by: FAMILY MEDICINE

## 2024-05-16 PROCEDURE — 70481 CT ORBIT/EAR/FOSSA W/DYE: CPT

## 2024-05-16 RX ADMIN — IOPAMIDOL 50 ML: 755 INJECTION, SOLUTION INTRAVENOUS at 09:52

## 2024-05-20 ENCOUNTER — TELEPHONE (OUTPATIENT)
Dept: FAMILY MEDICINE CLINIC | Age: 64
End: 2024-05-20
Payer: COMMERCIAL

## 2024-05-20 DIAGNOSIS — H65.90 FLUID COLLECTION OF MIDDLE EAR: Primary | ICD-10-CM

## 2024-05-20 NOTE — TELEPHONE ENCOUNTER
Pt called and said he saw his CT scan temporal bones done on 05/16/24.  He is needing a referral for fluid on his ears to the ENT.  He has seen Dr Chapito Flores for hearing loss.  He is a audiologist.  He is wanting a referral to the ENT in [place] for the fluid on his ears.   CT Temporal Bones With Contrast (05/16/2024 09:55)

## 2024-05-22 ENCOUNTER — TELEPHONE (OUTPATIENT)
Dept: FAMILY MEDICINE CLINIC | Age: 64
End: 2024-05-22
Payer: COMMERCIAL

## 2024-05-22 ENCOUNTER — DOCUMENTATION (OUTPATIENT)
Dept: FAMILY MEDICINE CLINIC | Age: 64
End: 2024-05-22
Payer: COMMERCIAL

## 2024-05-22 DIAGNOSIS — F17.210 CIGARETTE SMOKER: Primary | ICD-10-CM

## 2024-05-22 NOTE — TELEPHONE ENCOUNTER
Pt sent a [employer] message back that its okay to set up the low dose CT scan.   CT Chest Low Dose Cancer Screening WO (05/19/2023 11:01)

## 2024-05-22 NOTE — TELEPHONE ENCOUNTER
Sent a [employer] message to inform him and make sure he is okay with Dr Mena placing a referral.

## 2024-05-23 DIAGNOSIS — H91.93 HEARING DIFFICULTY OF BOTH EARS: Primary | ICD-10-CM

## 2024-06-05 ENCOUNTER — HOSPITAL ENCOUNTER (OUTPATIENT)
Dept: CT IMAGING | Facility: HOSPITAL | Age: 64
Discharge: HOME | End: 2024-06-05
Payer: COMMERCIAL

## 2024-06-05 DIAGNOSIS — F17.210 CIGARETTE SMOKER: ICD-10-CM

## 2024-06-05 PROCEDURE — 71271 CT THORAX LUNG CANCER SCR C-: CPT

## 2024-07-17 ENCOUNTER — OFFICE VISIT (OUTPATIENT)
Dept: OTOLARYNGOLOGY | Facility: CLINIC | Age: 64
End: 2024-07-17
Payer: COMMERCIAL

## 2024-07-17 VITALS
WEIGHT: 252 LBS | BODY MASS INDEX: 34.13 KG/M2 | DIASTOLIC BLOOD PRESSURE: 80 MMHG | SYSTOLIC BLOOD PRESSURE: 145 MMHG | OXYGEN SATURATION: 93 % | HEIGHT: 72 IN

## 2024-07-17 DIAGNOSIS — H91.93 HEARING DIFFICULTY OF BOTH EARS: Primary | ICD-10-CM

## 2024-07-17 DIAGNOSIS — H74.91 MASTOID DISORDER, RIGHT: ICD-10-CM

## 2024-07-17 DIAGNOSIS — J30.1 SEASONAL ALLERGIC RHINITIS DUE TO POLLEN: ICD-10-CM

## 2024-07-18 RX ORDER — FLUTICASONE PROPIONATE 50 MCG
2 SPRAY, SUSPENSION (ML) NASAL DAILY
Qty: 16 G | Refills: 6 | Status: SHIPPED | OUTPATIENT
Start: 2024-07-18

## 2024-07-18 NOTE — PROGRESS NOTES
Patient Name: Marquise Murphy   Visit Date: 07/17/2024   Patient ID: [id number]  Provider: Epifanio Pollard MD    Sex: male  Location: [facility]   YOB: 1960  Location Address: [address] [place] [address], [address] [place] [address],?[place]?[id number]    Primary Care Provider Kip Mena MD  Location Phone: [phone]    Referring Provider: No ref. provider found        Chief Complaint  Establish Care (Consult - Fluid on ear )    Subjective    History of Present Illness  Marquise Murphy is a 63 y.o. male who presents to [facility] today as a consult from No ref. provider found.    He presents to the clinic today for evaluation of his ears and hearing, longstanding hearing loss, CT scan findings of right mastoid mucosal thickening, and allergic rhinitis.  He informs me that he has had a long history of difficulty breathing through his nose and clear drainage which she relates to allergies.  Denies any major sinus infections or purulent drainage.  He got an MRI earlier this year which showed some mucosal thickening within the right breast waited ended up having a follow-up CT scan of the temporal bones which I reviewed personally today along with him.  This shows essentially normal middle ears and mastoids aside from some mucosal inflammation on the mastoid tip on the right side.  Left side looks completely normal.  In discussing his ears he notes that he has had a long history of hearing loss and does have difficulty in day-to-day situations.  For what ever reason the right ear has been weaker for many years.  Denies any pain or pressure symptoms and has had no drainage.  He is currently getting by without hearing aids but does have some difficulties in day-to-day situations.  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    History reviewed. No pertinent past medical history.    Past Surgical History:   Procedure Laterality  "Date    COLONOSCOPY  2023    POLYPS    EYE ENUCLEATION Right     FROM TRAUMA    FACIAL FRACTURE SURGERY      TONSILLECTOMY           Current Outpatient Medications:     atorvastatin (Lipitor) 20 MG tablet, Take 1 tablet by mouth Daily., Disp: 90 tablet, Rfl: 1    lisinopril (PRINIVIL,ZESTRIL) 10 MG tablet, Take 1 tablet by mouth Daily., Disp: 90 tablet, Rfl: 3    fluticasone (FLONASE) 50 MCG/ACT nasal spray, 2 sprays into the nostril(s) as directed by provider Daily. Administer 2 sprays in each nostril for each dose., Disp: 16 g, Rfl: 6     No Known Allergies    Family History   Problem Relation Age of Onset    Diabetes Mother     Heart disease Father         Social History     Social History Narrative    Not on file       Objective     Vital Signs:   /80 (BP Location: Right arm, Patient Position: Sitting, Cuff Size: Large Adult)   Ht 182.9 cm (72.01\")   Wt 114 kg (252 lb)   SpO2 93%   BMI 34.17 kg/m²       Physical Exam    Constitutional   Appearance  : well developed, well-nourished, alert and in no acute distress, voice clear and strong    Head  Inspection  : no deformities or lesions  Face  Inspection  : No facial lesions; House-Brackmann I/VI bilaterally  Palpation  : No TMJ crepitus nor  muscle tenderness bilaterally    Eyes  Vision  Visual Fields  : Extraocular movements are intact. No spontaneous or gaze-induced nystagmus.  Conjunctivae  : clear  Sclerae  : clear  Pupils and Irises  : pupils equal, round, and reactive to light.     Ears, Nose, Mouth and Throat    Ears    External Ears  : appearance within normal limits, no lesions present  Otoscopic Examination  : Tympanic membrane appearance within normal limits bilaterally without perforations, well-aerated middle ears  Hearing  : intact to conversational voice both ears  Tunning fork testing:     :    Nose    External Nose  : appearance normal  Intranasal Exam  : mucosa mildly congested, vestibules normal, no intranasal lesions " present, septum midline, sinuses non tender to percussion  Oral Cavity    Oral Mucosa  : oral mucosa normal without pallor or cyanosis  Lips  : lip appearance normal  Teeth  : normal dentition for age  Gums  : gums pink, non-swollen, no bleeding present  Tongue  : tongue appearance normal; normal mobility  Palate  : hard palate normal, soft palate appearance normal with symmetric mobility    Throat    Oropharynx  : no inflammation or lesions present, tonsils within normal limits  Hypopharynx  : appearance within normal limits, superior epiglottis within normal limits  Larynx  : appearance within normal limits, vocal cords within normal limits, no lesions present    Neck  Inspection/Palpation  : normal appearance, no masses or tenderness, trachea midline; thyroid size normal, nontender, no nodules or masses present on palpation    Respiratory  Respiratory Effort  : breathing unlabored  Inspection of Chest  : normal appearance, no retractions    Cardiovascular  Heart  : regular rate and rhythm    Lymphatic  Neck  : no lymphadenopathy present  Supraclavicular Nodes  : no lymphadenopathy present  Preauricular Nodes  : no lymphadenopathy present    Skin and Subcutaneous Tissue  General Inspection  : Regarding face and neck - there are no rashes present, no lesions present, and no areas of discoloration    Neurologic  Cranial Nerves  : cranial nerves II-XII are grossly intact bilaterally  Gait and Station  : normal gait, able to stand without diffculty    Psychiatric  Judgement and Insight  : judgment and insight intact  Mood and Affect  : mood normal, affect appropriate              Assessment and Plan    Diagnoses and all orders for this visit:    1. Hearing difficulty of both ears (Primary)    2. Mastoid disorder, right    3. Seasonal allergic rhinitis due to pollen  -     fluticasone (FLONASE) 50 MCG/ACT nasal spray; 2 sprays into the nostril(s) as directed by provider Daily. Administer 2 sprays in each nostril for  each dose.  Dispense: 16 g; Refill: 6    Examination today revealed mildly congested nasal mucosa.  I did recommend nasal saline rinses for this, and I prescribed Flonase for him to use nightly after the rinses.  Audiogram results were discussed and I did recommend hearing aid trial for him.  As far as the CT scan findings of mastoid mucosal inflammation, since he is not symptomatic and the middle ear is free of any fluid or infection, I do think this is a chronic issue and is not consistent with acute mastoiditis.  Certainly if he has any worsening symptoms or issues such as pain behind the ear, I will be glad to reassess and see him back in the clinic.  He understands to contact me if this is the case.    Follow Up   No follow-ups on file.  Patient was given instructions and counseling regarding his condition or for health maintenance advice. Please see specific information pulled into the AVS if appropriate.

## 2024-08-23 ENCOUNTER — OFFICE VISIT (OUTPATIENT)
Dept: FAMILY MEDICINE CLINIC | Age: 64
End: 2024-08-23
Payer: COMMERCIAL

## 2024-08-23 VITALS
BODY MASS INDEX: 34.84 KG/M2 | HEART RATE: 76 BPM | DIASTOLIC BLOOD PRESSURE: 93 MMHG | SYSTOLIC BLOOD PRESSURE: 170 MMHG | HEIGHT: 72 IN | WEIGHT: 257.2 LBS | TEMPERATURE: 98.3 F

## 2024-08-23 DIAGNOSIS — H91.93 HEARING DIFFICULTY OF BOTH EARS: ICD-10-CM

## 2024-08-23 DIAGNOSIS — I10 ESSENTIAL HYPERTENSION: ICD-10-CM

## 2024-08-23 DIAGNOSIS — Z00.00 ANNUAL PHYSICAL EXAM: Primary | ICD-10-CM

## 2024-08-23 DIAGNOSIS — E78.00 HIGH CHOLESTEROL: ICD-10-CM

## 2024-08-23 DIAGNOSIS — R55 NEAR SYNCOPE: ICD-10-CM

## 2024-08-23 PROBLEM — F17.210 CIGARETTE SMOKER: Status: RESOLVED | Noted: 2023-05-10 | Resolved: 2024-08-23

## 2024-08-23 PROBLEM — J30.89 OTHER ALLERGIC RHINITIS: Status: ACTIVE | Noted: 2024-08-23

## 2024-08-23 PROCEDURE — 99396 PREV VISIT EST AGE 40-64: CPT | Performed by: FAMILY MEDICINE

## 2024-08-23 RX ORDER — ATORVASTATIN CALCIUM 20 MG/1
20 TABLET, FILM COATED ORAL DAILY
Qty: 90 TABLET | Refills: 3 | Status: SHIPPED | OUTPATIENT
Start: 2024-08-23

## 2024-08-23 RX ORDER — LISINOPRIL 10 MG/1
10 TABLET ORAL DAILY
Qty: 90 TABLET | Refills: 3 | Status: SHIPPED | OUTPATIENT
Start: 2024-08-23

## 2024-08-23 NOTE — ASSESSMENT & PLAN NOTE
MRI REPORT REVIEWED, WILL ADDRESS FURTHER AT NEXT VISIT IN TWO MONTHS.  MAY NEED FURTHER WORKUP:  ECHO, HOLTER, DOPPLER, EEG

## 2024-08-23 NOTE — ASSESSMENT & PLAN NOTE
ADVICE GIVEN RE:  SEATBELT USE, ALCOHOL USE, HEALTHY DIET, ROUTINE EYE AND DENTAL EXAM, ROUTINE VACCINATIONS.    HE WILL CONSIDER A ZOSTER VACCINATION   WILL NEED ROUTINE LABS AT HIS NEXT VISIT

## 2024-08-23 NOTE — PROGRESS NOTES
Chief Complaint  Annual Exam (Patient has been out of lisinopril and atorvastatin for about a month)    Subjective          Marquise Murphy presents to [facility]  History of Present Illness  --ANNUAL EXAM, LAST EXAM WAS ONE YEAR AGO, DOES NOT SMOKE, COLONOSCOPY WAS IN 2023  --CURRENTLY OUT OF BP AND CHOL MEDS  --HAD ONE MORE BRIEF EPISODE OF DIZZINESS A FEW DAYS AGO.  OK NOW.  MRI WAS NORMAL   --WOULD LIKE A DIFFERENT ENT OPINION REGARDING HIS HEARING DIFFICULTIES         No Known Allergies     Health Maintenance Due   Topic Date Due    TDAP/TD VACCINES (1 - Tdap) Never done    ZOSTER VACCINE (1 of 2) Never done    COVID-19 Vaccine (3 - 2023-24 season) 09/01/2023    BMI FOLLOWUP  06/20/2024    INFLUENZA VACCINE  08/01/2024        Current Outpatient Medications on File Prior to Visit   Medication Sig    fluticasone (FLONASE) 50 MCG/ACT nasal spray 2 sprays into the nostril(s) as directed by provider Daily. Administer 2 sprays in each nostril for each dose.    [DISCONTINUED] atorvastatin (Lipitor) 20 MG tablet Take 1 tablet by mouth Daily.    [DISCONTINUED] lisinopril (PRINIVIL,ZESTRIL) 10 MG tablet Take 1 tablet by mouth Daily.     No current facility-administered medications on file prior to visit.       Immunization History   Administered Date(s) Administered    COVID-19 (MODERNA) 1st,2nd,3rd Dose Monovalent 07/30/2021, 08/27/2021       Review of Systems   Constitutional:  Negative for activity change, appetite change, chills, fatigue and fever.   HENT:  Negative for congestion, ear pain, rhinorrhea and sore throat.    Eyes:  Negative for blurred vision and discharge.   Respiratory:  Negative for cough and shortness of breath.    Cardiovascular:  Negative for chest pain, palpitations and leg swelling.   Gastrointestinal:  Negative for abdominal pain, constipation, diarrhea, nausea and vomiting.   Genitourinary:  Negative for dysuria and hematuria.   Musculoskeletal:  Negative for  "arthralgias and myalgias.   Neurological:  Negative for headache.   Psychiatric/Behavioral:  Negative for depressed mood.         Objective     /93 (BP Location: Right arm, Patient Position: Sitting)   Pulse 76   Temp 98.3 °F (36.8 °C) (Oral)   Ht 182.9 cm (72\")   Wt 117 kg (257 lb 3.2 oz)   BMI 34.88 kg/m²       Physical Exam  Vitals and nursing note reviewed.   Constitutional:       General: He is not in acute distress.     Appearance: Normal appearance.   HENT:      Right Ear: Tympanic membrane normal.      Left Ear: Tympanic membrane normal.      Mouth/Throat:      Pharynx: Oropharynx is clear.   Eyes:      Conjunctiva/sclera: Conjunctivae normal.   Cardiovascular:      Rate and Rhythm: Normal rate and regular rhythm.      Heart sounds: Normal heart sounds. No murmur heard.  Pulmonary:      Effort: Pulmonary effort is normal.      Breath sounds: Normal breath sounds.   Abdominal:      General: Bowel sounds are normal.      Palpations: Abdomen is soft.      Tenderness: There is no abdominal tenderness.   Musculoskeletal:      Cervical back: Neck supple.      Right lower leg: No edema.      Left lower leg: No edema.   Lymphadenopathy:      Cervical: No cervical adenopathy.   Neurological:      General: No focal deficit present.      Mental Status: He is alert.      Cranial Nerves: No cranial nerve deficit.      Coordination: Coordination normal.      Gait: Gait normal.   Psychiatric:         Mood and Affect: Mood normal.         Behavior: Behavior normal.         Result Review :                             Assessment and Plan      Diagnoses and all orders for this visit:    1. Annual physical exam (Primary)  Assessment & Plan:  ADVICE GIVEN RE:  SEATBELT USE, ALCOHOL USE, HEALTHY DIET, ROUTINE EYE AND DENTAL EXAM, ROUTINE VACCINATIONS.    HE WILL CONSIDER A ZOSTER VACCINATION   WILL NEED ROUTINE LABS AT HIS NEXT VISIT       2. Essential hypertension  -     lisinopril (PRINIVIL,ZESTRIL) 10 MG tablet; " Take 1 tablet by mouth Daily.  Dispense: 90 tablet; Refill: 3    3. High cholesterol  -     atorvastatin (Lipitor) 20 MG tablet; Take 1 tablet by mouth Daily.  Dispense: 90 tablet; Refill: 3    4. Hearing difficulty of both ears  -     Ambulatory Referral to ENT (Otolaryngology)    5. Near syncope  Assessment & Plan:  MRI REPORT REVIEWED, WILL ADDRESS FURTHER AT NEXT VISIT IN TWO MONTHS.  MAY NEED FURTHER WORKUP:  ECHO, HOLTER, DOPPLER, EEG              Follow Up     No follow-ups on file.    Patient was given instructions and counseling regarding his condition or for health maintenance advice. Please see specific information pulled into the AVS if appropriate.

## 2024-08-29 ENCOUNTER — HOSPITAL ENCOUNTER (INPATIENT)
Facility: HOSPITAL | Age: 64
LOS: 2 days | Discharge: HOME | End: 2024-08-31
Attending: STUDENT IN AN ORGANIZED HEALTH CARE EDUCATION/TRAINING PROGRAM | Admitting: STUDENT IN AN ORGANIZED HEALTH CARE EDUCATION/TRAINING PROGRAM
Payer: COMMERCIAL

## 2024-08-29 DIAGNOSIS — I10 ESSENTIAL HYPERTENSION: ICD-10-CM

## 2024-08-29 DIAGNOSIS — Z95.5 STATUS POST INSERTION OF DRUG-ELUTING STENT INTO LEFT ANTERIOR DESCENDING (LAD) ARTERY: ICD-10-CM

## 2024-08-29 DIAGNOSIS — I21.02 STEMI INVOLVING LEFT ANTERIOR DESCENDING CORONARY ARTERY: Primary | ICD-10-CM

## 2024-08-29 LAB
ALBUMIN SERPL-MCNC: 4 G/DL (ref 3.5–5.2)
ALBUMIN/GLOB SERPL: 1.9 G/DL
ALP SERPL-CCNC: 67 U/L (ref 39–117)
ALT SERPL W P-5'-P-CCNC: 47 U/L (ref 1–41)
ANION GAP SERPL CALCULATED.3IONS-SCNC: 10 MMOL/L (ref 5–15)
AST SERPL-CCNC: 207 U/L (ref 1–40)
BILIRUB SERPL-MCNC: 0.3 MG/DL (ref 0–1.2)
BUN SERPL-MCNC: 18 MG/DL (ref 8–23)
BUN/CREAT SERPL: 14.9 (ref 7–25)
CALCIUM SPEC-SCNC: 9 MG/DL (ref 8.6–10.5)
CHLORIDE SERPL-SCNC: 104 MMOL/L (ref 98–107)
CO2 SERPL-SCNC: 22 MMOL/L (ref 22–29)
CREAT SERPL-MCNC: 1.21 MG/DL (ref 0.76–1.27)
DEPRECATED RDW RBC AUTO: 43.4 FL (ref 37–54)
EGFRCR SERPLBLD CKD-EPI 2021: 67.3 ML/MIN/1.73
ERYTHROCYTE [DISTWIDTH] IN BLOOD BY AUTOMATED COUNT: 12.2 % (ref 12.3–15.4)
GEN 5 2HR TROPONIN T REFLEX: ABNORMAL NG/L
GLOBULIN UR ELPH-MCNC: 2.1 GM/DL
GLUCOSE BLDC GLUCOMTR-MCNC: 125 MG/DL (ref 70–130)
GLUCOSE SERPL-MCNC: 145 MG/DL (ref 65–99)
HCT VFR BLD AUTO: 42.2 % (ref 37.5–51)
HGB BLD-MCNC: 14.1 G/DL (ref 13–17.7)
MCH RBC QN AUTO: 32.6 PG (ref 26.6–33)
MCHC RBC AUTO-ENTMCNC: 33.4 G/DL (ref 31.5–35.7)
MCV RBC AUTO: 97.5 FL (ref 79–97)
PLATELET # BLD AUTO: 181 10*3/MM3 (ref 140–450)
PMV BLD AUTO: 11 FL (ref 6–12)
POTASSIUM SERPL-SCNC: 4.2 MMOL/L (ref 3.5–5.2)
PROT SERPL-MCNC: 6.1 G/DL (ref 6–8.5)
RBC # BLD AUTO: 4.33 10*6/MM3 (ref 4.14–5.8)
SODIUM SERPL-SCNC: 136 MMOL/L (ref 136–145)
TROPONIN T DELTA: ABNORMAL
TROPONIN T SERPL HS-MCNC: 6577 NG/L
WBC NRBC COR # BLD AUTO: 12.48 10*3/MM3 (ref 3.4–10.8)

## 2024-08-29 PROCEDURE — 84484 ASSAY OF TROPONIN QUANT: CPT | Performed by: STUDENT IN AN ORGANIZED HEALTH CARE EDUCATION/TRAINING PROGRAM

## 2024-08-29 PROCEDURE — B240ZZ3 ULTRASONOGRAPHY OF SINGLE CORONARY ARTERY, INTRAVASCULAR: ICD-10-PCS | Performed by: STUDENT IN AN ORGANIZED HEALTH CARE EDUCATION/TRAINING PROGRAM

## 2024-08-29 PROCEDURE — 92941 PRQ TRLML REVSC TOT OCCL AMI: CPT | Performed by: STUDENT IN AN ORGANIZED HEALTH CARE EDUCATION/TRAINING PROGRAM

## 2024-08-29 PROCEDURE — B2111ZZ FLUOROSCOPY OF MULTIPLE CORONARY ARTERIES USING LOW OSMOLAR CONTRAST: ICD-10-PCS | Performed by: STUDENT IN AN ORGANIZED HEALTH CARE EDUCATION/TRAINING PROGRAM

## 2024-08-29 PROCEDURE — C1887 CATHETER, GUIDING: HCPCS | Performed by: STUDENT IN AN ORGANIZED HEALTH CARE EDUCATION/TRAINING PROGRAM

## 2024-08-29 PROCEDURE — C9606 PERC D-E COR REVASC W AMI S: HCPCS | Performed by: STUDENT IN AN ORGANIZED HEALTH CARE EDUCATION/TRAINING PROGRAM

## 2024-08-29 PROCEDURE — 4A023N7 MEASUREMENT OF CARDIAC SAMPLING AND PRESSURE, LEFT HEART, PERCUTANEOUS APPROACH: ICD-10-PCS | Performed by: STUDENT IN AN ORGANIZED HEALTH CARE EDUCATION/TRAINING PROGRAM

## 2024-08-29 PROCEDURE — 25010000002 MIDAZOLAM PER 1 MG: Performed by: STUDENT IN AN ORGANIZED HEALTH CARE EDUCATION/TRAINING PROGRAM

## 2024-08-29 PROCEDURE — 92978 ENDOLUMINL IVUS OCT C 1ST: CPT | Performed by: STUDENT IN AN ORGANIZED HEALTH CARE EDUCATION/TRAINING PROGRAM

## 2024-08-29 PROCEDURE — 85347 COAGULATION TIME ACTIVATED: CPT

## 2024-08-29 PROCEDURE — C1725 CATH, TRANSLUMIN NON-LASER: HCPCS | Performed by: STUDENT IN AN ORGANIZED HEALTH CARE EDUCATION/TRAINING PROGRAM

## 2024-08-29 PROCEDURE — 80053 COMPREHEN METABOLIC PANEL: CPT | Performed by: STUDENT IN AN ORGANIZED HEALTH CARE EDUCATION/TRAINING PROGRAM

## 2024-08-29 PROCEDURE — C1769 GUIDE WIRE: HCPCS | Performed by: STUDENT IN AN ORGANIZED HEALTH CARE EDUCATION/TRAINING PROGRAM

## 2024-08-29 PROCEDURE — 82948 REAGENT STRIP/BLOOD GLUCOSE: CPT

## 2024-08-29 PROCEDURE — 85027 COMPLETE CBC AUTOMATED: CPT | Performed by: STUDENT IN AN ORGANIZED HEALTH CARE EDUCATION/TRAINING PROGRAM

## 2024-08-29 PROCEDURE — 25010000002 NICARDIPINE 2.5 MG/ML SOLUTION: Performed by: STUDENT IN AN ORGANIZED HEALTH CARE EDUCATION/TRAINING PROGRAM

## 2024-08-29 PROCEDURE — C1894 INTRO/SHEATH, NON-LASER: HCPCS | Performed by: STUDENT IN AN ORGANIZED HEALTH CARE EDUCATION/TRAINING PROGRAM

## 2024-08-29 PROCEDURE — 027034Z DILATION OF CORONARY ARTERY, ONE ARTERY WITH DRUG-ELUTING INTRALUMINAL DEVICE, PERCUTANEOUS APPROACH: ICD-10-PCS | Performed by: STUDENT IN AN ORGANIZED HEALTH CARE EDUCATION/TRAINING PROGRAM

## 2024-08-29 PROCEDURE — 93458 L HRT ARTERY/VENTRICLE ANGIO: CPT | Performed by: STUDENT IN AN ORGANIZED HEALTH CARE EDUCATION/TRAINING PROGRAM

## 2024-08-29 PROCEDURE — C1874 STENT, COATED/COV W/DEL SYS: HCPCS | Performed by: STUDENT IN AN ORGANIZED HEALTH CARE EDUCATION/TRAINING PROGRAM

## 2024-08-29 PROCEDURE — 25010000002 HEPARIN (PORCINE) PER 1000 UNITS: Performed by: STUDENT IN AN ORGANIZED HEALTH CARE EDUCATION/TRAINING PROGRAM

## 2024-08-29 PROCEDURE — C1753 CATH, INTRAVAS ULTRASOUND: HCPCS | Performed by: STUDENT IN AN ORGANIZED HEALTH CARE EDUCATION/TRAINING PROGRAM

## 2024-08-29 PROCEDURE — C1760 CLOSURE DEV, VASC: HCPCS | Performed by: STUDENT IN AN ORGANIZED HEALTH CARE EDUCATION/TRAINING PROGRAM

## 2024-08-29 PROCEDURE — B2151ZZ FLUOROSCOPY OF LEFT HEART USING LOW OSMOLAR CONTRAST: ICD-10-PCS | Performed by: STUDENT IN AN ORGANIZED HEALTH CARE EDUCATION/TRAINING PROGRAM

## 2024-08-29 PROCEDURE — 25010000002 FENTANYL CITRATE (PF) 50 MCG/ML SOLUTION: Performed by: STUDENT IN AN ORGANIZED HEALTH CARE EDUCATION/TRAINING PROGRAM

## 2024-08-29 PROCEDURE — 99223 1ST HOSP IP/OBS HIGH 75: CPT | Performed by: STUDENT IN AN ORGANIZED HEALTH CARE EDUCATION/TRAINING PROGRAM

## 2024-08-29 PROCEDURE — 25510000001 IOPAMIDOL PER 1 ML: Performed by: STUDENT IN AN ORGANIZED HEALTH CARE EDUCATION/TRAINING PROGRAM

## 2024-08-29 DEVICE — XIENCE SKYPOINT™ EVEROLIMUS ELUTING CORONARY STENT SYSTEM 3.00 MM X 38 MM / RAPID-EXCHANGE
Type: IMPLANTABLE DEVICE | Site: CORONARY | Status: FUNCTIONAL
Brand: XIENCE SKYPOINT™

## 2024-08-29 RX ORDER — ASPIRIN 81 MG/1
81 TABLET ORAL DAILY
Status: DISCONTINUED | OUTPATIENT
Start: 2024-08-29 | End: 2024-08-31 | Stop reason: HOSPADM

## 2024-08-29 RX ORDER — OXYCODONE AND ACETAMINOPHEN 5; 325 MG/1; MG/1
1 TABLET ORAL EVERY 6 HOURS PRN
Status: DISCONTINUED | OUTPATIENT
Start: 2024-08-29 | End: 2024-08-30

## 2024-08-29 RX ORDER — LISINOPRIL 10 MG/1
10 TABLET ORAL DAILY
Status: DISCONTINUED | OUTPATIENT
Start: 2024-08-29 | End: 2024-08-31 | Stop reason: HOSPADM

## 2024-08-29 RX ORDER — ATORVASTATIN CALCIUM 20 MG/1
40 TABLET, FILM COATED ORAL NIGHTLY
Status: DISCONTINUED | OUTPATIENT
Start: 2024-08-29 | End: 2024-08-31 | Stop reason: HOSPADM

## 2024-08-29 RX ORDER — METOPROLOL SUCCINATE 25 MG/1
25 TABLET, EXTENDED RELEASE ORAL DAILY
Status: DISCONTINUED | OUTPATIENT
Start: 2024-08-29 | End: 2024-08-31 | Stop reason: HOSPADM

## 2024-08-29 RX ORDER — IOPAMIDOL 755 MG/ML
INJECTION, SOLUTION INTRAVASCULAR
Status: DISCONTINUED | OUTPATIENT
Start: 2024-08-29 | End: 2024-08-29 | Stop reason: HOSPADM

## 2024-08-29 RX ORDER — HEPARIN SODIUM 1000 [USP'U]/ML
INJECTION, SOLUTION INTRAVENOUS; SUBCUTANEOUS
Status: DISCONTINUED | OUTPATIENT
Start: 2024-08-29 | End: 2024-08-29 | Stop reason: HOSPADM

## 2024-08-29 RX ORDER — NITROGLYCERIN 0.4 MG/1
0.4 TABLET SUBLINGUAL
Status: DISCONTINUED | OUTPATIENT
Start: 2024-08-29 | End: 2024-08-31 | Stop reason: HOSPADM

## 2024-08-29 RX ORDER — ACETAMINOPHEN 325 MG/1
650 TABLET ORAL EVERY 4 HOURS PRN
Status: DISCONTINUED | OUTPATIENT
Start: 2024-08-29 | End: 2024-08-31 | Stop reason: HOSPADM

## 2024-08-29 RX ORDER — NICARDIPINE HYDROCHLORIDE 2.5 MG/ML
INJECTION INTRAVENOUS
Status: DISCONTINUED | OUTPATIENT
Start: 2024-08-29 | End: 2024-08-29 | Stop reason: HOSPADM

## 2024-08-29 RX ORDER — FENTANYL CITRATE 50 UG/ML
INJECTION, SOLUTION INTRAMUSCULAR; INTRAVENOUS
Status: DISCONTINUED | OUTPATIENT
Start: 2024-08-29 | End: 2024-08-29 | Stop reason: HOSPADM

## 2024-08-29 RX ORDER — VERAPAMIL HYDROCHLORIDE 2.5 MG/ML
INJECTION, SOLUTION INTRAVENOUS
Status: DISCONTINUED | OUTPATIENT
Start: 2024-08-29 | End: 2024-08-29 | Stop reason: HOSPADM

## 2024-08-29 RX ORDER — LIDOCAINE HYDROCHLORIDE 20 MG/ML
INJECTION, SOLUTION INFILTRATION; PERINEURAL
Status: DISCONTINUED | OUTPATIENT
Start: 2024-08-29 | End: 2024-08-29 | Stop reason: HOSPADM

## 2024-08-29 RX ORDER — MIDAZOLAM HYDROCHLORIDE 1 MG/ML
INJECTION INTRAMUSCULAR; INTRAVENOUS
Status: DISCONTINUED | OUTPATIENT
Start: 2024-08-29 | End: 2024-08-29 | Stop reason: HOSPADM

## 2024-08-29 RX ADMIN — METOPROLOL SUCCINATE 25 MG: 25 TABLET, EXTENDED RELEASE ORAL at 13:40

## 2024-08-29 RX ADMIN — OXYCODONE AND ACETAMINOPHEN 1 TABLET: 5; 325 TABLET ORAL at 15:15

## 2024-08-29 RX ADMIN — TICAGRELOR 90 MG: 90 TABLET ORAL at 19:46

## 2024-08-29 RX ADMIN — ASPIRIN 81 MG: 81 TABLET, COATED ORAL at 12:32

## 2024-08-29 RX ADMIN — ATORVASTATIN CALCIUM 40 MG: 10 TABLET, FILM COATED ORAL at 19:44

## 2024-08-29 RX ADMIN — OXYCODONE AND ACETAMINOPHEN 1 TABLET: 5; 325 TABLET ORAL at 21:15

## 2024-08-29 RX ADMIN — LISINOPRIL 10 MG: 10 TABLET ORAL at 12:32

## 2024-08-29 RX ADMIN — ACETAMINOPHEN 325MG 650 MG: 325 TABLET ORAL at 14:12

## 2024-08-29 NOTE — Clinical Note
Addended by: CHRIS MCBRIDE on: 1/22/2024 01:05 PM     Modules accepted: Orders     Inserted under fluoro.

## 2024-08-29 NOTE — PLAN OF CARE
Pt admitted to CICU today s/p PCI , x1 stent placed to LAD, Aox4, on room air, complaints of back pain related to bed rest, now that he is sitting up he feels much relief. TR band off, site clean,dry,intact and soft, right fem site scant oozing, soft to touch. PRN pain meds given. Family updated.     Goal Outcome Evaluation:  Plan of Care Reviewed With: patient, family    Progress: improving

## 2024-08-29 NOTE — H&P
Date of Hospital Visit: 24  Encounter Provider: Dillon Collazo MD  Place of Service: [facility]  Patient Name: Marquise Murphy  :1960  [id number]    Chief complaint: AWSTEMI    History of Present Illness:  63-year-old man with hypertension, hyperlipidemia, hearing loss who presented with severe chest pain, found to have anterior ST elevations on EKG.  He was taken emergently to the Cath Lab which revealed a 100% occlusion of the proximal LAD.  He received successful IVUS guided PCI with a 3.0 x 38mm Xience Skypoint drug-eluting stent (postdilated proximally with a 4.0 mm NC and distally with a 3.0 mm NC)    Past Medical History:   Diagnosis Date    Asthma FOR YEARS    HARD TO BREATH THRU NOSE    HL (hearing loss) FOR YEARS IN RIGHT EAR    GOES IN AND OUT    Visual impairment        Past Surgical History:   Procedure Laterality Date    COLONOSCOPY      POLYPS    EYE ENUCLEATION Right     FROM TRAUMA    FACIAL FRACTURE SURGERY      TONSILLECTOMY         Medications Prior to Admission   Medication Sig Dispense Refill Last Dose    atorvastatin (Lipitor) 20 MG tablet Take 1 tablet by mouth Daily. 90 tablet 3 Past Month    fluticasone (FLONASE) 50 MCG/ACT nasal spray 2 sprays into the nostril(s) as directed by provider Daily. Administer 2 sprays in each nostril for each dose. 16 g 6 More than a month    lisinopril (PRINIVIL,ZESTRIL) 10 MG tablet Take 1 tablet by mouth Daily. 90 tablet 3 More than a month       Current Meds  No current facility-administered medications on file prior to encounter.     Current Outpatient Medications on File Prior to Encounter   Medication Sig Dispense Refill    atorvastatin (Lipitor) 20 MG tablet Take 1 tablet by mouth Daily. 90 tablet 3    fluticasone (FLONASE) 50 MCG/ACT nasal spray 2 sprays into the nostril(s) as directed by provider Daily. Administer 2 sprays in each nostril for each dose. 16 g 6    lisinopril (PRINIVIL,ZESTRIL) 10  MG tablet Take 1 tablet by mouth Daily. 90 tablet 3       Social History     Socioeconomic History    Marital status: Single   Tobacco Use    Smoking status: Former     Current packs/day: 0.00     Average packs/day: 0.5 packs/day for 42.0 years (21.0 ttl pk-yrs)     Types: Cigarettes     Start date: 10/1/1981     Quit date: 10/2023     Years since quittin.9     Passive exposure: Past    Smokeless tobacco: Never   Vaping Use    Vaping status: Never Used   Substance and Sexual Activity    Alcohol use: Yes    Drug use: Never    Sexual activity: Not Currently     Partners: Female       Family Hx: Non-contributory    REVIEW OF SYSTEMS:   ROS was performed and is negative except as outlined in HPI     REVIEW OF SYSTEMS:   CONSTITUTIONAL: No weight loss, fever, chills, weakness or fatigue.   HEENT: Eyes: No visual loss, blurred vision, double vision or yellow sclerae. Ears, Nose, Throat: No hearing loss, sneezing, congestion, runny nose or sore throat.   SKIN: No rash or itching.     RESPIRATORY: No shortness of breath, hemoptysis, cough or sputum.   GASTROINTESTINAL: No anorexia, nausea, vomiting or diarrhea. No abdominal pain, bright red blood per rectum or melena.  NEUROLOGICAL: No headache, dizziness, syncope, paralysis, numbness or tingling in the extremities.  MUSCULOSKELETAL: No muscle, back pain, joint pain or stiffness.   HEMATOLOGIC: No anemia, bleeding or bruising.   LYMPHATICS: No enlarged nodes.  PSYCHIATRIC: No history of depression, anxiety, hallucinations.   ENDOCRINOLOGIC: No reports of sweating, cold or heat intolerance. No polyuria or polydipsia.        Objective:     Vitals:    24 1300 24 1345 24 1400 24 1500   BP: (!) 138/101 148/89 138/84 151/75   Pulse: 86 91 84 88   Resp:       SpO2: 97% 96% 95% 96%     There is no height or weight on file to calculate BMI.      GEN: no distress, alert and oriented  HEENT: NACT, EOMI, moist mucous membranes  Lungs: CTAB, no wheezes, rales  or rhonchi  CV: normal rate, regular rhythm, normal S1, S2, no murmurs, +2 radial pulses b/l, no carotid bruit  Abdomen: soft, nontender, nondistended, NABS  Extremities: no edema  Skin: no rash, warm, dry  Heme/Lymph: no bruising  Psych: organized thought, normal behavior and affect    Results from last 7 days   Lab Units 08/29/24  1138   SODIUM mmol/L 136   POTASSIUM mmol/L 4.2   CHLORIDE mmol/L 104   CO2 mmol/L 22.0   BUN mg/dL 18   CREATININE mg/dL 1.21   CALCIUM mg/dL 9.0   BILIRUBIN mg/dL 0.3   ALK PHOS U/L 67   ALT (SGPT) U/L 47*   AST (SGOT) U/L 207*   GLUCOSE mg/dL 145*     Results from last 7 days   Lab Units 08/29/24  1138   HSTROP T ng/L 6,577*     @LABRCNTbnp@  Results from last 7 days   Lab Units 08/29/24  1138   WBC 10*3/mm3 12.48*   HEMOGLOBIN g/dL 14.1   HEMATOCRIT % 42.2   PLATELETS 10*3/mm3 181             @LABRCNTIP(chol,trig,hdl,ldl)      I personally viewed and interpreted the patient's EKG/Telemetry data      Assessment:  Active Hospital Problems    Diagnosis  POA    **STEMI involving left anterior descending coronary artery [I21.02]  Yes      Resolved Hospital Problems   No resolved problems to display.       Plan:  -Aspirin 81 mg daily, ticagrelor 90 mg twice daily  -Echocardiogram in 48 hours to rule out LV thrombus  -Start Toprol 25 mg daily  -Continue home lisinopril  -Increase atorvastatin to 40 mg        Dillon Cortez MD, [facility]  08/29/24  15:37 EDT.

## 2024-08-29 NOTE — Clinical Note
First balloon inflation max pressure = 16 dominik. First balloon inflation duration = 5 seconds. Second inflation of balloon - Max pressure = 16 dominki. 2nd Inflation of balloon - Duration = 5 seconds. 2nd inflation was done at 11:38 EDT.

## 2024-08-29 NOTE — Clinical Note
First balloon inflation max pressure = 14 dominik. First balloon inflation duration = 6 seconds. Second inflation of balloon - Max pressure = 8 dominik. 2nd Inflation of balloon - Duration = 6 seconds. 2nd inflation was done at 11:29 EDT.

## 2024-08-29 NOTE — Clinical Note
First balloon inflation max pressure = 16 dominik. First balloon inflation duration = 5 seconds. Second inflation of balloon - Max pressure = 18 dominik. 2nd Inflation of balloon - Duration = 6 seconds. 2nd inflation was done at 11:15 EDT.

## 2024-08-29 NOTE — Clinical Note
First balloon inflation max pressure = 12 dominik. First balloon inflation duration = 8 seconds. Second inflation of balloon - Max pressure = 12 dominik. 2nd Inflation of balloon - Duration = 8 seconds. 2nd inflation was done at 11:01 EDT. Third inflation of balloon - Max pressure = 12 dominik. 3rd Inflation of balloon - Duration = 8 seconds. 3rd inflation was done at 11:02 EDT.

## 2024-08-29 NOTE — Clinical Note
Hemostasis started on the right femoral artery. StarClose SE was used in achieving hemostasis. Closure device deployed in the vessel. Hemostasis achieved successfully. Closure device additional comment: 4x4's and tegaderm applied

## 2024-08-29 NOTE — Clinical Note
First balloon inflation max pressure = 18 dominik. First balloon inflation duration = 6 seconds. Second inflation of balloon - Max pressure = 18 dominik. 2nd Inflation of balloon - Duration = 6 seconds. 2nd inflation was done at 11:31 EDT.

## 2024-08-30 LAB
ACT BLD: 244 SECONDS (ref 82–152)
ACT BLD: 262 SECONDS (ref 82–152)
ACT BLD: 293 SECONDS (ref 82–152)
ACT BLD: 299 SECONDS (ref 82–152)
ANION GAP SERPL CALCULATED.3IONS-SCNC: 11.3 MMOL/L (ref 5–15)
BUN SERPL-MCNC: 14 MG/DL (ref 8–23)
BUN/CREAT SERPL: 15.6 (ref 7–25)
CALCIUM SPEC-SCNC: 9.2 MG/DL (ref 8.6–10.5)
CHLORIDE SERPL-SCNC: 100 MMOL/L (ref 98–107)
CHOLEST SERPL-MCNC: 163 MG/DL (ref 0–200)
CO2 SERPL-SCNC: 22.7 MMOL/L (ref 22–29)
CREAT SERPL-MCNC: 0.9 MG/DL (ref 0.76–1.27)
DEPRECATED RDW RBC AUTO: 44.8 FL (ref 37–54)
EGFRCR SERPLBLD CKD-EPI 2021: 96 ML/MIN/1.73
ERYTHROCYTE [DISTWIDTH] IN BLOOD BY AUTOMATED COUNT: 12.4 % (ref 12.3–15.4)
GLUCOSE SERPL-MCNC: 138 MG/DL (ref 65–99)
HBA1C MFR BLD: 6.3 % (ref 4.8–5.6)
HCT VFR BLD AUTO: 42.5 % (ref 37.5–51)
HDLC SERPL-MCNC: 37 MG/DL (ref 40–60)
HGB BLD-MCNC: 14.4 G/DL (ref 13–17.7)
LDLC SERPL CALC-MCNC: 99 MG/DL (ref 0–100)
LDLC/HDLC SERPL: 2.57 {RATIO}
MCH RBC QN AUTO: 33.3 PG (ref 26.6–33)
MCHC RBC AUTO-ENTMCNC: 33.9 G/DL (ref 31.5–35.7)
MCV RBC AUTO: 98.2 FL (ref 79–97)
PLATELET # BLD AUTO: 172 10*3/MM3 (ref 140–450)
PMV BLD AUTO: 10.9 FL (ref 6–12)
POTASSIUM SERPL-SCNC: 4.2 MMOL/L (ref 3.5–5.2)
QT INTERVAL: 350 MS
QTC INTERVAL: 421 MS
RBC # BLD AUTO: 4.33 10*6/MM3 (ref 4.14–5.8)
SODIUM SERPL-SCNC: 134 MMOL/L (ref 136–145)
TRIGL SERPL-MCNC: 155 MG/DL (ref 0–150)
VLDLC SERPL-MCNC: 27 MG/DL (ref 5–40)
WBC NRBC COR # BLD AUTO: 12.55 10*3/MM3 (ref 3.4–10.8)

## 2024-08-30 PROCEDURE — 93005 ELECTROCARDIOGRAM TRACING: CPT | Performed by: STUDENT IN AN ORGANIZED HEALTH CARE EDUCATION/TRAINING PROGRAM

## 2024-08-30 PROCEDURE — 93010 ELECTROCARDIOGRAM REPORT: CPT | Performed by: INTERNAL MEDICINE

## 2024-08-30 PROCEDURE — 83036 HEMOGLOBIN GLYCOSYLATED A1C: CPT | Performed by: STUDENT IN AN ORGANIZED HEALTH CARE EDUCATION/TRAINING PROGRAM

## 2024-08-30 PROCEDURE — 99232 SBSQ HOSP IP/OBS MODERATE 35: CPT | Performed by: STUDENT IN AN ORGANIZED HEALTH CARE EDUCATION/TRAINING PROGRAM

## 2024-08-30 PROCEDURE — 80048 BASIC METABOLIC PNL TOTAL CA: CPT | Performed by: STUDENT IN AN ORGANIZED HEALTH CARE EDUCATION/TRAINING PROGRAM

## 2024-08-30 PROCEDURE — 80061 LIPID PANEL: CPT | Performed by: STUDENT IN AN ORGANIZED HEALTH CARE EDUCATION/TRAINING PROGRAM

## 2024-08-30 PROCEDURE — 85027 COMPLETE CBC AUTOMATED: CPT | Performed by: STUDENT IN AN ORGANIZED HEALTH CARE EDUCATION/TRAINING PROGRAM

## 2024-08-30 RX ORDER — OXYCODONE AND ACETAMINOPHEN 5; 325 MG/1; MG/1
1 TABLET ORAL EVERY 4 HOURS PRN
Status: DISCONTINUED | OUTPATIENT
Start: 2024-08-30 | End: 2024-08-31 | Stop reason: HOSPADM

## 2024-08-30 RX ADMIN — OXYCODONE AND ACETAMINOPHEN 1 TABLET: 5; 325 TABLET ORAL at 08:40

## 2024-08-30 RX ADMIN — ASPIRIN 81 MG: 81 TABLET, COATED ORAL at 08:20

## 2024-08-30 RX ADMIN — ATORVASTATIN CALCIUM 40 MG: 10 TABLET, FILM COATED ORAL at 20:51

## 2024-08-30 RX ADMIN — TICAGRELOR 90 MG: 90 TABLET ORAL at 20:51

## 2024-08-30 RX ADMIN — METOPROLOL SUCCINATE 25 MG: 25 TABLET, EXTENDED RELEASE ORAL at 08:20

## 2024-08-30 RX ADMIN — OXYCODONE AND ACETAMINOPHEN 1 TABLET: 5; 325 TABLET ORAL at 03:58

## 2024-08-30 RX ADMIN — LISINOPRIL 10 MG: 10 TABLET ORAL at 08:20

## 2024-08-30 RX ADMIN — TICAGRELOR 90 MG: 90 TABLET ORAL at 08:20

## 2024-08-30 NOTE — PROGRESS NOTES
Patient Name: Marquise Murphy  :1960  63 y.o.      Patient Care Team:  Kip Mena MD as PCP - General (Family Medicine)    Chief Complaint:   Anterior STEMI    Interval History:   Subjective dyspnea this morning.    Objective   Vital Signs  Temp:  [97.8 °F (36.6 °C)-98.1 °F (36.7 °C)] 97.8 °F (36.6 °C)  Heart Rate:  [79-92] 79  Resp:  [11-18] 14  BP: ()/() 98/59    Intake/Output Summary (Last 24 hours) at 2024 0926  Last data filed at 2024 0900  Gross per 24 hour   Intake 760 ml   Output 2100 ml   Net -1340 ml         GEN: no distress, alert and oriented  HEENT: NACT, EOMI, moist mucous membranes  Lungs: CTAB, no wheezes, rales or rhonchi  CV: normal rate, regular rhythm, normal S1, S2, no murmurs, radial artery access site CDI, no hematoma, pulse 2+. Right common femoral artery access site CDI, no hematoma, pulse 2+  Abdomen: soft, nontender, nondistended, NABS  Extremities: no edema  Skin: no rash, warm, dry  Heme/Lymph: no bruising  Psych: organized thought, normal behavior and affect    Results Review:    Results from last 7 days   Lab Units 24  0411   SODIUM mmol/L 134*   POTASSIUM mmol/L 4.2   CHLORIDE mmol/L 100   CO2 mmol/L 22.7   BUN mg/dL 14   CREATININE mg/dL 0.90   GLUCOSE mg/dL 138*   CALCIUM mg/dL 9.2     Results from last 7 days   Lab Units 24  1601 24  1138   HSTROP T ng/L >10,000* 6,577*     Results from last 7 days   Lab Units 24  0411   WBC 10*3/mm3 12.55*   HEMOGLOBIN g/dL 14.4   HEMATOCRIT % 42.5   PLATELETS 10*3/mm3 172             Results from last 7 days   Lab Units 24  0411   CHOLESTEROL mg/dL 163   TRIGLYCERIDES mg/dL 155*   HDL CHOL mg/dL 37*   LDL CHOL mg/dL 99               Medication Review:   aspirin, 81 mg, Oral, Daily  atorvastatin, 40 mg, Oral, Nightly  lisinopril, 10 mg, Oral, Daily  metoprolol succinate XL, 25 mg, Oral, Daily  ticagrelor, 90 mg, Oral, BID              Assessment & Plan   #Anterior  STEMI  #HTN  #HLD    63-year-old man with hypertension, hyperlipidemia, hearing loss who presented with severe chest pain, found to have anterior ST elevations on EKG. He was taken emergently to the Cath Lab which revealed a 100% occlusion of the proximal LAD. He received successful IVUS guided PCI with a 3.0 x 38mm Xience Skypoint drug-eluting stent (postdilated proximally with a 4.0 mm NC and distally with a 3.0 mm NC.) Left ventriculography revealed mid to distal anterior wall and apical hypokinesis.    His subjective dyspnea is likely secondary to the ticagrelor.  I explained that this will likely subside in the next coming weeks that ideally we would continue this if he is to remain on DAPT.  We will obtain echocardiogram with [facility] tomorrow.  If this were to reveal an LV thrombus, his Brilinta will need to be switched to Plavix and he can continue Plavix and Eliquis without aspirin.  If he does not have an LV thrombus, would continue aspirin and Brilinta for at least 1 year.    - Continue atorvastatin 40 mg daily, Toprol 25 mg daily, lisinopril 10 mg daily.  - DC home tomorrow after echocardiogram    Dillon Cortez MD, FACC, [facility]  08/30/24  09:26 EDT

## 2024-08-30 NOTE — PAYOR COMM NOTE
"Marquise Godoy \"Vega\" (63 y.o. Male)                          ATTENTION INITIAL CLINICALS AUTH PENDING [id number]                        REPLY TO [facility] DEPT  [phone] OR CALL   SERENITY WISE LPJASMIN                    Date of Birth   1960    Social Security Number       Address   [address] [place] [address]    Home Phone   [phone]    MRN   [id number]       Jehovah's witness   None    Marital Status   Single                            Admission Date   8/29/24    Admission Type   Emergency    Admitting Provider   Dillon Collazo MD    Attending Provider   Dillon Collazo MD    Department, Room/Bed   [facility] CARDIAC INTENSIVE CARE, 3007/1       Discharge Date       Discharge Disposition       Discharge Destination                                 Attending Provider: Dillon Collazo MD    Allergies: No Known Allergies    Isolation: None   Infection: None   Code Status: CPR    Ht: 182.9 cm (72\")   Wt: 117 kg (257 lb 3.2 oz)    Admission Cmt: None   Principal Problem: STEMI involving left anterior descending coronary artery [I21.02]                   Active Insurance as of 8/29/2024       Primary Coverage       Payor Plan Insurance Group Employer/Plan Group    ANTHEM BLUE CROSS ANTHEM BLUE CROSS BLUE SHIELD PPO [id number]       Payor Plan Address Payor Plan Phone Number Payor Plan Fax Number Effective Dates    [address] [phone]  1/1/2020 - None Entered    [place] [address]         Subscriber Name Subscriber Birth Date Member ID       MARQUISE GODOY 1960 [id number]                     Emergency Contacts        (Rel.) Home Phone Work Phone Mobile Phone    Geri Marley (Sister) -- -- [phone]                 History & Physical        Dillon Collazo MD at 08/29/24 1597          Date of Hospital Visit: 08/29/24  Encounter Provider: Dillon Collazo MD  Place of Service: [facility] CARDIOLOGY  Patient Name: Marquise MARTINEZ" Jeffrey  :1960  [id number]    Chief complaint: AWSTEMI    History of Present Illness:  63-year-old man with hypertension, hyperlipidemia, hearing loss who presented with severe chest pain, found to have anterior ST elevations on EKG.  He was taken emergently to the Cath Lab which revealed a 100% occlusion of the proximal LAD.  He received successful IVUS guided PCI with a 3.0 x 38mm Xience Skypoint drug-eluting stent (postdilated proximally with a 4.0 mm NC and distally with a 3.0 mm NC)    Past Medical History:   Diagnosis Date    Asthma FOR YEARS    HARD TO BREATH THRU NOSE    HL (hearing loss) FOR YEARS IN RIGHT EAR    GOES IN AND OUT    Visual impairment        Past Surgical History:   Procedure Laterality Date    COLONOSCOPY      POLYPS    EYE ENUCLEATION Right     FROM TRAUMA    FACIAL FRACTURE SURGERY      TONSILLECTOMY         Medications Prior to Admission   Medication Sig Dispense Refill Last Dose    atorvastatin (Lipitor) 20 MG tablet Take 1 tablet by mouth Daily. 90 tablet 3 Past Month    fluticasone (FLONASE) 50 MCG/ACT nasal spray 2 sprays into the nostril(s) as directed by provider Daily. Administer 2 sprays in each nostril for each dose. 16 g 6 More than a month    lisinopril (PRINIVIL,ZESTRIL) 10 MG tablet Take 1 tablet by mouth Daily. 90 tablet 3 More than a month       Current Meds  No current facility-administered medications on file prior to encounter.     Current Outpatient Medications on File Prior to Encounter   Medication Sig Dispense Refill    atorvastatin (Lipitor) 20 MG tablet Take 1 tablet by mouth Daily. 90 tablet 3    fluticasone (FLONASE) 50 MCG/ACT nasal spray 2 sprays into the nostril(s) as directed by provider Daily. Administer 2 sprays in each nostril for each dose. 16 g 6    lisinopril (PRINIVIL,ZESTRIL) 10 MG tablet Take 1 tablet by mouth Daily. 90 tablet 3       Social History     Socioeconomic History    Marital status: Single   Tobacco Use    Smoking status:  Former     Current packs/day: 0.00     Average packs/day: 0.5 packs/day for 42.0 years (21.0 ttl pk-yrs)     Types: Cigarettes     Start date: 10/1/1981     Quit date: 10/2023     Years since quittin.9     Passive exposure: Past    Smokeless tobacco: Never   Vaping Use    Vaping status: Never Used   Substance and Sexual Activity    Alcohol use: Yes    Drug use: Never    Sexual activity: Not Currently     Partners: Female       Family Hx: Non-contributory    REVIEW OF SYSTEMS:   ROS was performed and is negative except as outlined in HPI     REVIEW OF SYSTEMS:   CONSTITUTIONAL: No weight loss, fever, chills, weakness or fatigue.   HEENT: Eyes: No visual loss, blurred vision, double vision or yellow sclerae. Ears, Nose, Throat: No hearing loss, sneezing, congestion, runny nose or sore throat.   SKIN: No rash or itching.     RESPIRATORY: No shortness of breath, hemoptysis, cough or sputum.   GASTROINTESTINAL: No anorexia, nausea, vomiting or diarrhea. No abdominal pain, bright red blood per rectum or melena.  NEUROLOGICAL: No headache, dizziness, syncope, paralysis, numbness or tingling in the extremities.  MUSCULOSKELETAL: No muscle, back pain, joint pain or stiffness.   HEMATOLOGIC: No anemia, bleeding or bruising.   LYMPHATICS: No enlarged nodes.  PSYCHIATRIC: No history of depression, anxiety, hallucinations.   ENDOCRINOLOGIC: No reports of sweating, cold or heat intolerance. No polyuria or polydipsia.        Objective:     Vitals:    24 1300 24 1345 24 1400 24 1500   BP: (!) 138/101 148/89 138/84 151/75   Pulse: 86 91 84 88   Resp:       SpO2: 97% 96% 95% 96%     There is no height or weight on file to calculate BMI.      GEN: no distress, alert and oriented  HEENT: NACT, EOMI, moist mucous membranes  Lungs: CTAB, no wheezes, rales or rhonchi  CV: normal rate, regular rhythm, normal S1, S2, no murmurs, +2 radial pulses b/l, no carotid bruit  Abdomen: soft, nontender, nondistended,  NABS  Extremities: no edema  Skin: no rash, warm, dry  Heme/Lymph: no bruising  Psych: organized thought, normal behavior and affect    Results from last 7 days   Lab Units 08/29/24  1138   SODIUM mmol/L 136   POTASSIUM mmol/L 4.2   CHLORIDE mmol/L 104   CO2 mmol/L 22.0   BUN mg/dL 18   CREATININE mg/dL 1.21   CALCIUM mg/dL 9.0   BILIRUBIN mg/dL 0.3   ALK PHOS U/L 67   ALT (SGPT) U/L 47*   AST (SGOT) U/L 207*   GLUCOSE mg/dL 145*     Results from last 7 days   Lab Units 08/29/24  1138   HSTROP T ng/L 6,577*     @LABRCNTbnp@  Results from last 7 days   Lab Units 08/29/24  1138   WBC 10*3/mm3 12.48*   HEMOGLOBIN g/dL 14.1   HEMATOCRIT % 42.2   PLATELETS 10*3/mm3 181             @LABRCNTIP(chol,trig,hdl,ldl)      I personally viewed and interpreted the patient's EKG/Telemetry data      Assessment:  Active Hospital Problems    Diagnosis  POA    **STEMI involving left anterior descending coronary artery [I21.02]  Yes      Resolved Hospital Problems   No resolved problems to display.       Plan:  -Aspirin 81 mg daily, ticagrelor 90 mg twice daily  -Echocardiogram in 48 hours to rule out LV thrombus  -Start Toprol 25 mg daily  -Continue home lisinopril  -Increase atorvastatin to 40 mg        Dillon Cortez MD, [facility]  08/29/24  15:37 EDT.      Electronically signed by Dillon Collazo MD at 08/29/24 1538           Vital Signs (last day)       Date/Time Temp Temp src Pulse Resp BP Patient Position SpO2    08/30/24 1000 -- -- 85 -- 113/67 -- 97    08/30/24 0900 -- -- 88 -- 118/75 -- 95    08/30/24 0800 98.1 (36.7) Oral 79 -- 98/59 -- 96    08/30/24 0700 -- -- 81 -- 117/80 -- 97    08/30/24 0600 -- -- 80 -- 105/71 -- 97    08/30/24 0500 -- -- 89 -- 100/57 -- 93    08/30/24 0401 97.8 (36.6) Oral -- -- -- -- --    08/30/24 0400 -- -- 90 -- 120/76 -- 95 08/30/24 0300 -- -- 82 -- 116/81 -- 95 08/30/24 0200 -- -- 85 -- 109/68 -- 95 08/30/24 0100 -- -- 86 -- 104/71 -- 93    08/30/24 0000 -- -- 86 -- 136/86 -- 93    08/29/24 2339  97.9 (36.6) Oral -- -- -- -- --    08/29/24 2300 -- -- 87 -- 130/76 -- 94    08/29/24 2200 -- -- 87 -- 127/73 -- 92 08/29/24 2100 -- -- 89 -- 141/79 -- 92    08/29/24 2000 -- -- 91 -- 129/83 -- 92    08/29/24 1944 98.1 (36.7) Oral -- -- -- -- --    08/29/24 1900 -- -- 92 -- 151/98 -- 92 08/29/24 1800 -- -- 89 -- 127/93 -- 92    08/29/24 1700 -- -- 91 -- 147/86 -- 94 08/29/24 1600 -- -- 92 -- 148/84 -- 95    08/29/24 1500 -- -- 88 -- 151/75 -- 96 08/29/24 1400 -- -- 84 -- 138/84 -- 95 08/29/24 1345 -- -- 91 -- 148/89 -- 96 08/29/24 1300 -- -- 86 -- 138/101 -- 97    08/29/24 1226 -- -- 88 -- 157/89 -- 96 08/29/24 11:55:09 -- -- -- 14 -- -- 94 08/29/24 11:50:20 -- -- -- 17 -- -- 92 08/29/24 11:47:55 -- -- -- -- -- -- 98 08/29/24 11:45:45 -- -- -- 14 -- -- 98 08/29/24 11:40:46 -- -- -- 18 -- -- --    08/29/24 11:34:54 -- -- -- 16 -- -- --    08/29/24 11:29:32 -- -- -- 14 -- -- 95    08/29/24 1125 -- -- -- 12 -- -- 93 08/29/24 11:20:04 -- -- -- 18 -- -- --    08/29/24 11:15:25 -- -- -- 16 -- -- 89 08/29/24 11:10:28 -- -- -- 11 -- -- 89 08/29/24 11:05:27 -- -- -- 14 -- -- 92    08/29/24 11:00:45 -- -- -- 14 -- -- 98    08/29/24 10:56:14 -- -- -- 16 -- -- --    08/29/24 10:51:26 -- -- -- 12 -- -- --    08/29/24 10:46:42 -- -- -- 14 -- -- --    08/29/24 10:42:04 -- -- -- 14 -- -- --    08/29/24 10:40:24 -- -- -- -- -- -- 90 08/29/24 1037 -- -- -- 12 -- -- --          Oxygen Therapy (last day)       Date/Time SpO2 Device (Oxygen Therapy) Flow (L/min) Oxygen Concentration (%) ETCO2 (mmHg)    08/30/24 1000 97 -- -- -- --    08/30/24 0900 95 -- -- -- --    08/30/24 0820 -- room air -- -- --    08/30/24 0800 96 -- -- -- --    08/30/24 0700 97 -- -- -- --    08/30/24 0600 97 -- -- -- --    08/30/24 0500 93 -- -- -- --    08/30/24 0445 -- room air -- -- --    08/30/24 0400 95 -- -- -- --    08/30/24 0300 95 -- -- -- --    08/30/24 0200 95 -- -- -- --    08/30/24 0100 93 -- -- -- --     08/30/24 0008 -- room air -- -- --    08/30/24 0000 93 -- -- -- --    08/29/24 2300 94 -- -- -- --    08/29/24 2200 92 -- -- -- --    08/29/24 2100 92 -- -- -- --    08/29/24 2000 92 room air -- -- --    08/29/24 1900 92 -- -- -- --    08/29/24 1800 92 -- -- -- --    08/29/24 1700 94 -- -- -- --    08/29/24 1600 95 -- -- -- --    08/29/24 1500 96 -- -- -- --    08/29/24 1400 95 -- -- -- --    08/29/24 1345 96 -- -- -- --    08/29/24 1300 97 -- -- -- --    08/29/24 1230 -- room air -- -- --    08/29/24 1226 96 -- -- -- --    08/29/24 11:55:09 94 -- -- -- --    08/29/24 11:50:20 92 -- -- -- --    08/29/24 11:47:55 98 nasal cannula with ETCO2 3 -- --    08/29/24 11:45:45 98 -- -- -- --    08/29/24 11:29:32 95 -- -- -- --    08/29/24 1125 93 -- -- -- --    08/29/24 11:15:25 89 -- -- -- --    08/29/24 11:10:28 89 -- -- -- --    08/29/24 11:05:27 92 -- -- -- --    08/29/24 11:03:24 -- nonrebreather mask 15 -- --    08/29/24 11:00:45 98 -- -- -- --    08/29/24 10:40:24 90 nasal cannula with ETCO2 3 -- --          Lines, Drains & Airways       Active LDAs       Name Placement date Placement time Site Days    Peripheral IV 08/29/24 Right Antecubital 08/29/24  --  Antecubital  1    Peripheral IV   Posterior;Right Forearm --  Placed by EMS  --  Placed by EMS  Forearm  --              Inactive LDAs       Name Placement date Placement time Removal date Removal time Site Days    [REMOVED] Arterial Sheath 6 Fr. Right Radial 08/29/24  1042  08/29/24  1152  Radial  less than 1    [REMOVED] Arterial Sheath 6 Fr. Right Femoral 08/29/24  1051  08/29/24  1147  Femoral  less than 1                  Facility-Administered Medications as of 8/30/2024   Medication Dose Route Frequency Provider Last Rate Last Admin    acetaminophen (TYLENOL) tablet 650 mg  650 mg Oral Q4H PRN Dillon Collazo MD   650 mg at 08/29/24 1412    aspirin EC tablet 81 mg  81 mg Oral Daily Dillon Collazo MD   81 mg at 08/30/24 0820    atorvastatin (LIPITOR) tablet 40 mg  40  mg Oral Nightly Dillon Collazo MD   40 mg at 08/29/24 1944    lisinopril (PRINIVIL,ZESTRIL) tablet 10 mg  10 mg Oral Daily Dillon Collazo MD   10 mg at 08/30/24 0820    metoprolol succinate XL (TOPROL-XL) 24 hr tablet 25 mg  25 mg Oral Daily Dillon Collazo MD   25 mg at 08/30/24 0820    nitroglycerin (NITROSTAT) SL tablet 0.4 mg  0.4 mg Sublingual Q5 Min PRN Dillon Collazo MD        oxyCODONE-acetaminophen (PERCOCET) 5-325 MG per tablet 1 tablet  1 tablet Oral Q4H PRN Dillon Collazo MD        ticagrelor (BRILINTA) tablet 90 mg  90 mg Oral BID Dillon Collazo MD   90 mg at 08/30/24 0820     Orders (last 24 hrs)        Start     Ordered    08/31/24 0000  Adult Transthoracic Echo Complete W/ Cont if Necessary Per Protocol  Once        Comments: With definity to rule out LV thrombus    08/29/24 1202    08/30/24 0915  POC Activated Clotting Time  PROCEDURE ONCE         08/29/24 1123    08/30/24 0915  POC Activated Clotting Time  PROCEDURE ONCE         08/29/24 1135    08/30/24 0915  POC Activated Clotting Time  PROCEDURE ONCE         08/29/24 1148    08/30/24 0914  POC Activated Clotting Time  PROCEDURE ONCE         08/29/24 1106    08/30/24 0840  Transfer Patient  Once         08/30/24 0840    08/30/24 0839  oxyCODONE-acetaminophen (PERCOCET) 5-325 MG per tablet 1 tablet  Every 4 Hours PRN         08/30/24 0840    08/30/24 0700  ECG 12 Lead Other; post procedure  Once         08/29/24 1202    08/30/24 0600  CBC (No Diff)  Morning Draw         08/29/24 1202    08/30/24 0600  Basic Metabolic Panel  Morning Draw         08/29/24 1202    08/30/24 0600  Hemoglobin A1c  Morning Draw         08/29/24 1202    08/30/24 0600  Lipid Panel  Morning Draw        Comments: Fasting      08/29/24 1202    08/29/24 2100  atorvastatin (LIPITOR) tablet 40 mg  Nightly         08/29/24 1202    08/29/24 2100  ticagrelor (BRILINTA) tablet 90 mg  2 Times Daily         08/29/24 1220    08/29/24 1537  High Sensitivity Troponin T 2Hr  PROCEDURE ONCE         08/29/24  1231    08/29/24 1508  oxyCODONE-acetaminophen (PERCOCET) 5-325 MG per tablet 1 tablet  Every 6 Hours PRN,   Status:  Discontinued         08/29/24 1508    08/29/24 1300  lisinopril (PRINIVIL,ZESTRIL) tablet 10 mg  Daily         08/29/24 1202    08/29/24 1300  ticagrelor (BRILINTA) tablet 90 mg  2 Times Daily,   Status:  Discontinued         08/29/24 1202    08/29/24 1300  metoprolol succinate XL (TOPROL-XL) 24 hr tablet 25 mg  Daily         08/29/24 1202    08/29/24 1300  aspirin EC tablet 81 mg  Daily         08/29/24 1202    08/29/24 1248  POC Activated Clotting Time  PROCEDURE ONCE         08/30/24 0914    08/29/24 1235  POC Activated Clotting Time  PROCEDURE ONCE         08/30/24 0914    08/29/24 1226  POC Glucose Once  PROCEDURE ONCE        Comments: Complete no more than 45 minutes prior to patient eating      08/29/24 1224    08/29/24 1223  POC Activated Clotting Time  PROCEDURE ONCE         08/30/24 0914    08/29/24 1206  POC Activated Clotting Time  PROCEDURE ONCE         08/30/24 0913    08/29/24 1200  Strict Intake & Output  Every 4 Hours       08/29/24 1202    08/29/24 1200  Incentive Spirometry  Every 2 Hours While Awake       08/29/24 1202    08/29/24 1158  Closure Device Used  Once         08/29/24 1202    08/29/24 1158  Assess Puncture Site, Vital Signs, Distal Pulses & Observe Site for Bleeding or Swelling  Per Order Details        Comments: Every 15 Minutes x4, Every 30 Minutes x4, & Every 1 Hour x2    08/29/24 1202    08/29/24 1158  Diet: Cardiac; Healthy Heart (2-3 Na+); Fluid Consistency: Thin (IDDSI 0)  Diet Effective Now         08/29/24 1202    08/29/24 1157  Code Status and Medical Interventions: CPR (Attempt to Resuscitate); Full Support  Continuous         08/29/24 1202    08/29/24 1157  Vital Signs, Site Check & Distal Extremity Check for Warmth, Color, Sensation & Pulses  Per Order Details         08/29/24 1202    08/29/24 1157  Continuous Cardiac Monitoring  Continuous        Comments:  Follow Standing Orders As Outlined in Process Instructions (Open Order Report to View Full Instructions)    08/29/24 1202    08/29/24 1157  Maintain IV Access  Continuous         08/29/24 1202    08/29/24 1157  Telemetry - Place Orders & Notify Provider of Results When Patient Experiences Acute Chest Pain, Dysrhythmia or Respiratory Distress  Continuous        Comments: Open Order Report to View Parameters Requiring Provider Notification    08/29/24 1202    08/29/24 1157  May Be Off Telemetry for Tests  Continuous         08/29/24 1202    08/29/24 1157  Encourage Fluids  Until Discontinued         08/29/24 1202    08/29/24 1157  Continuous Pulse Oximetry  Continuous         08/29/24 1202    08/29/24 1157  Advance Diet As Tolerated -  Until Discontinued         08/29/24 1202    08/29/24 1157  Notify MD if platelet count is less than 100,000, is less than 1/2 baseline, or if Hgb drops by more than 3mg/dl.  Until Discontinued         08/29/24 1202    08/29/24 1157  Notify MD of hypotension (SBP less than 95), bleeding, or dysrythmia and follow Sheath Removal Policy if needed.  Continuous         08/29/24 1202    08/29/24 1157  Hold metFORMIN (GLUCOPHAGE) for 48 Hours  Continuous         08/29/24 1202    08/29/24 1157  Inpatient Admission  Once         08/29/24 1202    08/29/24 1157  Discontinue Radial TR Band Per Removal Protocol  Per Order Details        Comments: If Bleeding Occurs Re-Inflate 2 mL & Then Continue With 2 mL Every 15 Minute Deflations. Gently Roll Band Off Insertion Site After Completely Deflated.    08/29/24 1202    08/29/24 1157  Vital Signs & Reverse Noble's Test (While Radial Compression Device in Place)  Per Order Details        Comments: Every 15 Minutes x4, Every 30 Minutes x4, & Every 1 Hour x2    08/29/24 1202    08/29/24 1157  Keep Affected Arm Straight & Elevated  Continuous         08/29/24 1202    08/29/24 1157  Activity As Tolerated  Until Discontinued         08/29/24 1202    08/29/24  1156  acetaminophen (TYLENOL) tablet 650 mg  Every 4 Hours PRN         08/29/24 1202    08/29/24 1156  nitroglycerin (NITROSTAT) SL tablet 0.4 mg  Every 5 Minutes PRN         08/29/24 1202    08/29/24 1154  ticagrelor (BRILINTA) tablet  Code / Trauma / Sedation Medication,   Status:  Discontinued         08/29/24 1154    08/29/24 1150  iopamidol (ISOVUE-370) 76 % injection  Code / Trauma / Sedation Medication,   Status:  Discontinued         08/29/24 1151    08/29/24 1137  High Sensitivity Troponin T  STAT         08/29/24 1137    08/29/24 1136  Comprehensive Metabolic Panel  STAT         08/29/24 1137    08/29/24 1136  CBC (No Diff)  STAT         08/29/24 1137    08/29/24 1106  Intravascular Ultrasound  Once         08/29/24 1105    08/29/24 1105  niCARdipine (CARDENE) injection  Code / Trauma / Sedation Medication,   Status:  Discontinued         08/29/24 1105    08/29/24 1043  nitroGLYCERIN 200 mcg/mL 30 mL syringe  Code / Trauma / Sedation Medication,   Status:  Discontinued         08/29/24 1043    08/29/24 1043  verapamil (ISOPTIN) injection  Code / Trauma / Sedation Medication,   Status:  Discontinued         08/29/24 1043    08/29/24 1043  heparin (porcine) injection  Code / Trauma / Sedation Medication,   Status:  Discontinued         08/29/24 1043    08/29/24 1040  lidocaine (XYLOCAINE) 2% injection  Code / Trauma / Sedation Medication,   Status:  Discontinued         08/29/24 1040    08/29/24 1039  fentaNYL citrate (PF) (SUBLIMAZE) injection  Code / Trauma / Sedation Medication,   Status:  Discontinued         08/29/24 1039    08/29/24 1039  midazolam (VERSED) injection  Code / Trauma / Sedation Medication,   Status:  Discontinued         08/29/24 1039    08/29/24 0000  Ambulatory Referral to Cardiac Rehab         08/29/24 1202    Unscheduled  Change Site Dressing  As Needed       08/29/24 1202    Unscheduled  Oxygen Therapy- Nasal Cannula; Titrate 1-6 LPM Per SpO2; 90 - 95%  Continuous PRN       08/29/24  1202    Unscheduled  Head of Bed: Flat; 2 Hours; Elevate Head of Bed: 30 Degrees; 2 Hours; Keep Affected Extremity/ Extremities Straight; Total Bedrest Time? 4 Hours  As Needed       24 1202                     Physician Progress Notes (last 24 hours)        Dillon Collazo MD at 24 0926              Patient Name: Marqusie Murphy  :1960  63 y.o.      Patient Care Team:  Kip Mena MD as PCP - General (Family Medicine)    Chief Complaint:   Anterior STEMI    Interval History:   Subjective dyspnea this morning.    Objective   Vital Signs  Temp:  [97.8 °F (36.6 °C)-98.1 °F (36.7 °C)] 97.8 °F (36.6 °C)  Heart Rate:  [79-92] 79  Resp:  [11-18] 14  BP: ()/() 98/59    Intake/Output Summary (Last 24 hours) at 2024 09  Last data filed at 2024 0900  Gross per 24 hour   Intake 760 ml   Output 2100 ml   Net -1340 ml         GEN: no distress, alert and oriented  HEENT: NACT, EOMI, moist mucous membranes  Lungs: CTAB, no wheezes, rales or rhonchi  CV: normal rate, regular rhythm, normal S1, S2, no murmurs, radial artery access site CDI, no hematoma, pulse 2+. Right common femoral artery access site CDI, no hematoma, pulse 2+  Abdomen: soft, nontender, nondistended, NABS  Extremities: no edema  Skin: no rash, warm, dry  Heme/Lymph: no bruising  Psych: organized thought, normal behavior and affect    Results Review:    Results from last 7 days   Lab Units 24  0411   SODIUM mmol/L 134*   POTASSIUM mmol/L 4.2   CHLORIDE mmol/L 100   CO2 mmol/L 22.7   BUN mg/dL 14   CREATININE mg/dL 0.90   GLUCOSE mg/dL 138*   CALCIUM mg/dL 9.2     Results from last 7 days   Lab Units 24  1601 24  1138   HSTROP T ng/L >10,000* 6,577*     Results from last 7 days   Lab Units 24  0411   WBC 10*3/mm3 12.55*   HEMOGLOBIN g/dL 14.4   HEMATOCRIT % 42.5   PLATELETS 10*3/mm3 172             Results from last 7 days   Lab Units 24  0411   CHOLESTEROL mg/dL 163    TRIGLYCERIDES mg/dL 155*   HDL CHOL mg/dL 37*   LDL CHOL mg/dL 99               Medication Review:   aspirin, 81 mg, Oral, Daily  atorvastatin, 40 mg, Oral, Nightly  lisinopril, 10 mg, Oral, Daily  metoprolol succinate XL, 25 mg, Oral, Daily  ticagrelor, 90 mg, Oral, BID              Assessment & Plan   #Anterior STEMI  #HTN  #HLD    63-year-old man with hypertension, hyperlipidemia, hearing loss who presented with severe chest pain, found to have anterior ST elevations on EKG. He was taken emergently to the Cath Lab which revealed a 100% occlusion of the proximal LAD. He received successful IVUS guided PCI with a 3.0 x 38mm Xience Skypoint drug-eluting stent (postdilated proximally with a 4.0 mm NC and distally with a 3.0 mm NC.) Left ventriculography revealed mid to distal anterior wall and apical hypokinesis.    His subjective dyspnea is likely secondary to the ticagrelor.  I explained that this will likely subside in the next coming weeks that ideally we would continue this if he is to remain on DAPT.  We will obtain echocardiogram with [facility] tomorrow.  If this were to reveal an LV thrombus, his Brilinta will need to be switched to Plavix and he can continue Plavix and Eliquis without aspirin.  If he does not have an LV thrombus, would continue aspirin and Brilinta for at least 1 year.    - Continue atorvastatin 40 mg daily, Toprol 25 mg daily, lisinopril 10 mg daily.  - DC home tomorrow after echocardiogram    Dillon Cortez MD, [facility], [facility]  08/30/24  09:26 EDT      Electronically signed by Dillon Collazo MD at 08/30/24 0929        [facility] CATH LAB  [address] [facility] [id number] [phone]              Cardiac Catheterization/Vascular Study    Accession Number: [id number]   Date of Study: 8/29/24   Ordering Provider: Dillon Collazo MD   Clinical Indications: STEMI        Performing Physician  Performing Staff   Primary: Dillon Collazo MD    Invasive Nurse:  Zoey Hedrick, RN   Invasive Nurse: Celia Bates, RN   Documenter: Dinora Alvarado, RN   Scrub Person: Anne-Marie Bailey   Scrub Person: Sd Mayen   Invasive Nurse: Kip Murdock Jr., RN   Documenter: Bambi Parra         Procedures    Intravascular Ultrasound   Coronary angiography   Left ventriculography   Left Heart Cath   Stent KATHY coronary   Percutaneous Coronary Intervention        Patient Information    Patient Name  Marquise Murphy MRN  [id number] Legal Sex  Male  (Age)  1960 (63 y.o.)     Race Ethnicity Encounter Category   White or  Not  or  Emergency        ISCV PACS Images     Show images for Cardiac Catheterization/Vascular Study         Printable Vessel Diagram    Printable Vessel Diagram       Physicians    Panel Physicians Referring Physician Case Authorizing Physician   Dillon Collazo MD (Primary)             Pre Procedure Diagnosis    STEMI         Conclusion    [facility]   CARDIAC CATHETERIZATION PROCEDURE REPORT     Patient: Marquise Murphy  : 1960  MRN: [id number]  Procedure Date: 24     Referring Physician:   EMS     Interventional Cardiologist:   Dillon Cortez MD     Indication:  Anterior STEMI     Clinical Presentation:  63-year-old man with hypertension, hyperlipidemia, hearing loss who presented with severe chest pain, found to have anterior ST elevations on EKG.      Procedure performed:  Diagnostic Left Heart Catheterization  Coronary Angiography  Left ventriculography  Intravascular ultrasound of LAD  Drug eluting stent to LAD  Ultrasound guided right common femoral artery access  Hemostasis of right common femoral arteriotomy site with a Perclose closure device     Access Site(s):  right radial artery  Right common femoral artery     Findings:  1. Coronary Artery Anatomy:  Dominance: co-dominant  Left Main: Large-caliber vessel that bifurcates into a large caliber LAD and large caliber circumflex.   This vessel is free of disease.  Left Anterior Descending: Large-caliber vessel with a 100% thrombotic occlusion in the proximal segment.  After PCI the vessel is a large-caliber vessel that gives rise to a large caliber first diagonal.  The diagonal is now in-stent correction and has a 30% stenosis in mid segment.  The mid to distal LAD continues as a large Vessel is free of disease.  The apical LAD is now occluded.  Circumflex Artery: Large-caliber vessel that continues and gives rise to a large caliber OM1.  The OM1 is free of disease.  The AV groove circumflex continues and gives rise to multiple LPL's that are free of disease.  Right Coronary Artery: Large-caliber vessel with a 20% stenosis in the proximal segment.  There is a 20% stenosis in the midsegment.  The distal RCA is free of disease and leads to a large caliber RPDA that is free of disease.     2. Hemodynamics:  Left Ventricle: 137/29 mmHg  Aorta: 133/79/103 mmHg     3. Left Ventriculogram:  Ejection Fraction: 40-45%  Wall Motion: Distal anterior and apical hypokinesis  Mitral Regurgitation: Mild     4. Percutaneous Intervention:  Location: Proximal LAD  Treatment: A ange blue coronary guidewire was used to wire through the proximal LAD stenosis and into the first diagonal.  It was very difficult trying to redirect the wire down into the LAD so I decided to predilate the stenosis with the wire in the diagonal.  I used a 3.0 x 12mm balloon to predilate stenosis with multiple inflations up to 12 dominik.  After this I was able to eventually redirect the wire down into the distal LAD and I placed a 3.0 x 38mm Xience Skypoint drug-eluting stent in the LAD.  Intravascular ultrasound was then performed and revealed adequate stent expansion out however there were areas of mall apposition proximally and in the midportion.  A 4.0 x 15 mm NC balloon was then used to post dilate the proximal to midportion of the stent with multiple inflations up to 18 dominik.  A guide liner  was advanced for better visualization and intravascular sound was again performed.  This revealed adequate stent expansion and apposition however there was an area at the very proximal edge with slight underexpansion.  A 4.0 x 15 mm NC was reintroduced and used to postdilated this area up to 14 dominik.  A 3.0 x 15mm NC was used to post dilate the mid to distal portion of the stent with multiple inflations up to 18 dominik.  Intravascular ultrasound was again performed and still revealed an area of slight underexpansion at the very proximal edge so a 4.0 x 15 mm NC was again used to postdilated this area with multiple inflations up to 16 dominik.  Pre-stenosis: 100 %  Post-stenosis: <1 %  Lesion Type C: Yes  TONJA Flow Pre: 0  TONJA Flow Post: 3  Bifurcation: No  Severe Calcium: No  Dissection: No        Conclusions:  Successful IVUS guided PCI to 100% thrombotic occlusion of the proximal LAD with a 3.0 38 Xience Skypoint drug-eluting stent (postdilated proximally with a 4.0 mm NC anddistally with 3.0 mm NC.)  Mild CAD of the other coronary vessels.  Moderate decrease in EF of 40 to 45% with apical and distal anterior hypokinesis.  Severely elevated LVEDP.     Recommendations:   Admit to CCU  Start aspirin and ticagrelor for at least 1 year  Echocardiogram in 48 hours to rule out LV thrombus          Procedure Status:  Emergent     Procedure Details:  Informed consent was obtained with an explanation of the risk and benefits of the procedure. The patient was brought to the Cardiac Catheterization Laboratory and was prepped and draped in a standard sterile fashion. Moderate sedation with Fentanyl and Versed was administered by the circulating nurse. Lidocaine 2% was used to anesthetize over the right radial artery.  A 6 Chinese sheath was placed using the modified Seldinger technique.  I tried to advance a 5 Chinese JR4 diagnostic catheter however 0.035 guidewire would not advance past the elbow.  I then used a BMP according to wire  into the upper arm however appeared to be in a very small branch has advancement of a 5 Kyrgyz catheter met significant resistance.  I then exchanged the catheter for a 4 Kyrgyz JR4 diagnostic after which was able to advance a little bit further but led to significant pain.  I took an angiogram of the arm this revealed what appeared to be a patent radial artery into the brachial artery over the significant tortuosity that would not allow the wire both 0.035 guidewire or the BMW coronary wire to wire into the more proximal artery.  At this point I decided to switch to femoral access.  Fluoroscopic and Ultrasound guided access was obtained and a 6 Kyrgyz sheath was placed in the right common femoral artery.  A 6 Kyrgyz EBU 3.5 guiding catheter was advanced over the wire and used engage the left main.  Diagnostic angiography obtained. Intravenous heparin was given by the circulating nurse and ACT's were checked periodically to ensure they were at goal. A ange blue coronary guidewire was used to wire through the proximal LAD stenosis and into the first diagonal.  It was very difficult trying to redirect the wire down into the LAD so I decided to predilate the stenosis with the wire in the diagonal.  I used a 3.0 x 12mm balloon to predilate stenosis with multiple inflations up to 12 dominik.  After this I was able to eventually redirect the wire down into the distal LAD and I placed a 3.0 x 38mm Xience Skypoint drug-eluting stent in the LAD.  Intravascular ultrasound was then performed and revealed adequate stent expansion out however there were areas of mall apposition proximally and in the midportion.  A 4.0 x 15 mm NC balloon was then used to post dilate the proximal to midportion of the stent with multiple inflations up to 18 dominik.  A guide liner was advanced for better visualization and intravascular sound was again performed.  This revealed adequate stent expansion and apposition however there was an area at the very  Hemostasis: Aluminum Chloride and Electrocautery proximal edge with slight underexpansion.  A 4.0 x 15 mm NC was reintroduced and used to postdilated this area up to 14 dominik.  A 3.0 x 15mm NC was used to post dilate the mid to distal portion of the stent with multiple inflations up to 18 dominik.  Intravascular ultrasound was again performed and still revealed an area of slight underexpansion at the very proximal edge so a 4.0 x 15 mm NC was again used to postdilated this area with multiple inflations up to 16 dominik. Post PCI, there was TONJA-3 flow, no evidence of residual dissection or perforation. The equipment was removed and the guiding catheter was exchanged over the wire for 5 Nauruan JR4 diagnostic catheter which was used to engage the right coronary artery with diagnostic angiography obtained.  This catheter was then exchanged over the wire for a 5 Nauruan 145 degree angled pigtail catheter which was used to cross aortic valve to measure left trickle pressures, perform left renography, and pullback was performed across aortic valve to assess for any aortic valve gradient.  The catheter was then removed over the wire. The catheter was removed over a wire. The femoral artery sheath was removed and a Perclose closure device was used with excellent hemostasis. Patient tolerated the procedure well without any complications, and transferred to the post procedure area for recovery in a stable condition.     Complications:  None.     Estimated Blood Loss:  Minimal.     Dillon Cortez MD  [facility]  08/29/24  15:35 EDT                  Time Under Physician Observation    Name Panel Role Time Period   Dillon Collazo MD Panel 1 Primary 8/29/2024 1019 - 8/29/2024 1154      Total Sedation Time    Moderate sedation event time was not documented.                     Vessel Access    A 6 fr sheath was successfully inserted into the right radial artery. A 6 fr sheath was successfully inserted using micropuncture technique with ultrasound guidance into the right femoral  artery.           Sheath Disposition    Hemostasis started on the right femoral artery. StarClose SE was used in achieving hemostasis. Closure device deployed in the vessel. Hemostasis achieved successfully. Closure device additional comment: 4x4's and tegaderm applied         Stent Inflated    Time Event Details User   11:04 AM Stent Inflated Stnt Cornry Rx Xience/Skypoint Rapdxng 3x38mm - First balloon inflation max pressure = 16 dominik. First balloon inflation duration = 5 seconds. AR       Balloon Inflated    Time Event Details User   10:55 AM Balloon Inflated Baln Trek Rx 3x12mm - First balloon inflation max pressure = 12 dominik. First balloon inflation duration = 6 seconds. AR   11:00 AM Balloon Inflated Baln Trek Rx 3x12mm - First balloon inflation max pressure = 12 dominik. First balloon inflation duration = 8 seconds. Second inflation of balloon - Max pressure = 12 dominik. 2nd Inflation of balloon - Duration = 8 seconds. 2nd inflation was done at 11:01 EDT. Third inflation of balloon - Max pressure = 12 dominik. 3rd Inflation of balloon - Duration = 8 seconds. 3rd inflation was done at 11:02 EDT. AR   11:13 AM Balloon Inflated Baln Dil Nctrekneo Rapdxng 4x15mm - First balloon inflation max pressure = 16 dominik. First balloon inflation duration = 5 seconds. Second inflation of balloon - Max pressure = 18 dominik. 2nd Inflation of balloon - Duration = 6 seconds. 2nd inflation was done at 11:15 EDT. AR   11:28 AM Balloon Inflated Baln Dil Nctrekneo Rapdxng 4x15mm - First balloon inflation max pressure = 14 dominik. First balloon inflation duration = 6 seconds. Second inflation of balloon - Max pressure = 8 dominik. 2nd Inflation of balloon - Duration = 6 seconds. 2nd inflation was done at 11:29 EDT. AR   11:30 AM Balloon Inflated Baln Dil Nctrekneo Rapdxng 3x15mm - First balloon inflation max pressure = 18 dominik. First balloon inflation duration = 6 seconds. Second inflation of balloon - Max pressure = 18 dominik. 2nd Inflation of balloon -  Duration = 6 seconds. 2nd inflation was done at 11:31 EDT. AR   11:37 AM Balloon Inflated Baln Dil Nctrekneo Rapdxng 4x12mm - First balloon inflation max pressure = 16 dominik. First balloon inflation duration = 5 seconds. Second inflation of balloon - Max pressure = 16 dominik. 2nd Inflation of balloon - Duration = 5 seconds. 2nd inflation was done at 11:38 EDT. AL                    Radiation    Time Event Details User   11:48 AM Radiation Tracking Panel 1: Dillon Collazo MD  Cumulative Air Kerma (mGy) = 1069.000; Fluoro Time (min) = 24.8; DAP (mGy-cm2) = 64928.000 AL        Total Contrast        Administrations occurring from 1034 to 1210 on 08/29/24:   Medication Name Total Dose   iopamidol (ISOVUE-370) 76 % injection 205 mL         Patient Hx Of Height, Weight, and Vitals    Height Weight BSA (Calculated - sq m) BMI (Calculated) Retired BMI (kg/m2) Pulse BP        88 151/75       Admission Information    Admission Date/Time Discharge Date/Time Room/Bed   08/29/24  1034  3007/1       Medications  (Filter: Administrations occurring from 0000 to 1547 on 08/29/24)    midazolam (VERSED) injection (mg)   Total dose: 5 mg  Date/Time Rate/Dose/Volume Action    08/29/24 1039 1 mg Given    1042 1 mg Given    1048 1 mg Given    1052 1 mg Given    1100 1 mg Given      fentaNYL citrate (PF) (SUBLIMAZE) injection (mcg)   Total dose: 150 mcg  Date/Time Rate/Dose/Volume Action    08/29/24 1039 25 mcg Given    1043 25 mcg Given    1048 50 mcg Given    1100 50 mcg Given      lidocaine (XYLOCAINE) 2% injection (mL)   Total volume: 13 mL  Date/Time Rate/Dose/Volume Action    08/29/24 1040 3 mL Given    1049 10 mL Given      heparin (porcine) injection (Units)   Total dose: 24,000 Units  Date/Time Rate/Dose/Volume Action    08/29/24 1043 3,000 Units Given    1052 10,000 Units Given    1110 4,000 Units Given    1127 4,000 Units Given    1139 3,000 Units Given      verapamil (ISOPTIN) injection (mg)   Total dose: 0.5 mg  Date/Time  Rate/Dose/Volume Action    08/29/24 1043 0.5 mg Given      nitroGLYCERIN 200 mcg/mL 30 mL syringe (mcg)   Total dose: 800 mcg  Date/Time Rate/Dose/Volume Action    08/29/24 1043 200 mcg Given    1105 200 mcg Given    1115 200 mcg Given    1120 200 mcg Given      niCARdipine (CARDENE) injection (mcg)   Total dose: 750 mcg  Date/Time Rate/Dose/Volume Action    08/29/24 1105 250 mcg Given    1116 250 mcg Given    1120 250 mcg Given      iopamidol (ISOVUE-370) 76 % injection (mL)   Total volume: 205 mL  Date/Time Rate/Dose/Volume Action    08/29/24 1150 205 mL Given      ticagrelor (BRILINTA) tablet (mg)   Total dose: 180 mg  Date/Time Rate/Dose/Volume Action    08/29/24 1154 180 mg Given      acetaminophen (TYLENOL) tablet 650 mg (mg)   Total dose: 650 mg Dosing weight: 117  Date/Time Rate/Dose/Volume Action    08/29/24 1412 650 mg Given      aspirin EC tablet 81 mg (mg)   Total dose: 81 mg Dosing weight: 117  Date/Time Rate/Dose/Volume Action    08/29/24 1232 81 mg Given      lisinopril (PRINIVIL,ZESTRIL) tablet 10 mg (mg)   Total dose: 10 mg Dosing weight: 117  Date/Time Rate/Dose/Volume Action    08/29/24 1232 10 mg Given      metoprolol succinate XL (TOPROL-XL) 24 hr tablet 25 mg (mg)   Total dose: 25 mg Dosing weight: 117  Date/Time Rate/Dose/Volume Action    08/29/24 1340 25 mg Given      oxyCODONE-acetaminophen (PERCOCET) 5-325 MG per tablet 1 tablet (tablet)   Total dose: 1 tablet Dosing weight: 117  Date/Time Rate/Dose/Volume Action    08/29/24 1515 1 tablet Given          Implants    Type Not Specified     Stnt Cornry Rx Xience/Skypoint Rapdxng 3x38mm -  - [id number] - Implanted    Inventory item: STNT CORNRY RX XIENCE/SKYPOINT RAPDXNG 3X38MM Model/Cat number: [id number]   Serial number: [id number] : [employer]   Lot number: [id number]       As of 8/29/2024    Status: Implanted            Supplies    Name ID Temporary Type Charge Code Description Charge Code Quantity   KT MANIFLD CARDIAC 302 No  Supply HC OR SUPPLY SHELL 272/NO CPT 862916 1   CATH GUIDELINER 6F 135CM 48161 No Diagnostic Catheter HC OR SUPPLY SHELL 278/ 928588 1   BALN TREK RX 3X12MM 88280 No Balloon HC OR SUPPLY SHELL 278/ 106200 1   CATH GUIDE LAUNCHER EBU3.5 6F 100CM 39402 No Guide Catheter HC OR SUPPLY SHELL 278/ 243896 1   BND COMPR/HEMO RADL VASCBAND REG 24CM 1P/U 81531 No Supply HC OR SUPPLY SHELL 272/NO CPT 386453 1   INTRO GLIDESHEATH SLENDER SS 21G .021 6F 10CM 45CM 30085 No Supply HC OR SUPPLY SHELL 272/ 688266 1   SHEATH INTRO PINN PERIPH .038 6F10CM 47217 No Supply HC OR SUPPLY SHELL 272/ 617412 1   CATH DIAG IMPULSE FR4 5F 100CM 22394 No Diagnostic Catheter HC OR SUPPLY SHELL 272/NO CPT 437884 1   DEV CLS VESL PERCLOSE PROGLIDE 34187 No Hemostasis Device HC OR SUPPLY SHELL 278/ 652206 1   CATH DIAG INFINITI ANG/PIG 6SH 145DEG 5F 97602 No Diagnostic Catheter HC OR SUPPLY SHELL 272/NO CPT 947936 1   GW EMR FIX EXCHG J STD .035 3MM 260CM 54047 No Guidewire HC OR SUPPLY SHELL 272/ 797263 1   GW HITORQUE/BAL MID/WT [id number] 59933 No Guidewire HC OR SUPPLY SHELL 272/ 896846 1   KT INTRO LEANNA MANDREL NITNL GW/SS 7 REG4F 63731 No Sheath HC OR SUPPLY SHELL 272/ 999773 1   CATH DIAG CARD PERFORM JR4.0 BT [id number] 03014 No Diagnostic Catheter HC OR SUPPLY SHELL 272/NO CPT 179971 1   DEV INDEFLATOR P/N 642955 40161 No Supply HC OR SUPPLY SHELL 272/NO CPT 861724 2   PK CATH CARD 40 29679 No Supply   1   CATH IMG IVUS CORNRY OPTICROSS/HD 60MHZ 5FR 135CM 027382 No Diagnostic Catheter HC OR SUPPLY SHELL 278/ 630484 1   GW SIONBLUE STR 190CM 435984 No Guidewire HC OR SUPPLY SHELL 272/ 070866 2   BALN DIL NCTREKNEO RAPDXNG 3X15MM 967711 No Balloon HC OR SUPPLY SHELL 278/ 304852 1   BALN DIL NCTREKNEO RAPDXNG 4X12MM 609959 No Balloon HC OR SUPPLY SHELL 278/ 556310 1   BALN DIL NCTREKNEO RAPDXNG 4X15MM 817050 No Balloon HC OR SUPPLY SHELL 278/ 381878 1        Signed    Electronically signed by Dillon Collazo MD on 8/29/24 at 1547 EDT     Cardiac Catheterization/Vascular Study  Order: [id number]  Status: Final result         Linked study orders: Intravascular Ultrasound (Order: [id number])      Result Care Coordination      Patient Communication     Released  Not seen              Link to Procedure Log    Procedure Log        Order-Level Documents:    Scan on 8/29/2024 1204 by New Onbase, [place]: CARDIAC CATH HEMO DATA - SCAN               Results Routing Tracking    View Results Routing Information     Order Report     Order Details

## 2024-08-30 NOTE — PLAN OF CARE
Goal Outcome Evaluation:              Outcome Evaluation: Pt A&Ox4, vss, n/c of pain. radial and femoral site c/di. Pt is up ad malena. Plan is to get Echo tomorrow and go home. Will continue with current POC and update as needed.

## 2024-08-31 ENCOUNTER — APPOINTMENT (OUTPATIENT)
Dept: CARDIOLOGY | Facility: HOSPITAL | Age: 64
End: 2024-08-31
Payer: COMMERCIAL

## 2024-08-31 ENCOUNTER — READMISSION MANAGEMENT (OUTPATIENT)
Dept: CALL CENTER | Facility: HOSPITAL | Age: 64
End: 2024-08-31
Payer: COMMERCIAL

## 2024-08-31 VITALS
RESPIRATION RATE: 16 BRPM | BODY MASS INDEX: 34.94 KG/M2 | HEART RATE: 81 BPM | SYSTOLIC BLOOD PRESSURE: 140 MMHG | HEIGHT: 72 IN | DIASTOLIC BLOOD PRESSURE: 79 MMHG | OXYGEN SATURATION: 97 % | TEMPERATURE: 97.2 F | WEIGHT: 257.94 LBS

## 2024-08-31 LAB
AORTIC DIMENSIONLESS INDEX: 0.9 (DI)
ASCENDING AORTA: 3.1 CM
BH CV ECHO MEAS - ACS: 2.19 CM
BH CV ECHO MEAS - AO MAX PG: 5.5 MMHG
BH CV ECHO MEAS - AO MEAN PG: 2.7 MMHG
BH CV ECHO MEAS - AO ROOT DIAM: 3.9 CM
BH CV ECHO MEAS - AO V2 MAX: 116.7 CM/SEC
BH CV ECHO MEAS - AO V2 VTI: 21 CM
BH CV ECHO MEAS - AVA(I,D): 2.9 CM2
BH CV ECHO MEAS - EDV(CUBED): 178 ML
BH CV ECHO MEAS - EDV(MOD-SP2): 110 ML
BH CV ECHO MEAS - EDV(MOD-SP4): 120 ML
BH CV ECHO MEAS - EF(MOD-BP): 36.8 %
BH CV ECHO MEAS - EF(MOD-SP2): 35.5 %
BH CV ECHO MEAS - EF(MOD-SP4): 39.2 %
BH CV ECHO MEAS - ESV(CUBED): 117 ML
BH CV ECHO MEAS - ESV(MOD-SP2): 71 ML
BH CV ECHO MEAS - ESV(MOD-SP4): 73 ML
BH CV ECHO MEAS - FS: 13.1 %
BH CV ECHO MEAS - IVS/LVPW: 0.98 CM
BH CV ECHO MEAS - IVSD: 1.19 CM
BH CV ECHO MEAS - LAT PEAK E' VEL: 10.2 CM/SEC
BH CV ECHO MEAS - LV MASS(C)D: 283.7 GRAMS
BH CV ECHO MEAS - LV MAX PG: 3.2 MMHG
BH CV ECHO MEAS - LV MEAN PG: 1.95 MMHG
BH CV ECHO MEAS - LV V1 MAX: 89.1 CM/SEC
BH CV ECHO MEAS - LV V1 VTI: 19 CM
BH CV ECHO MEAS - LVIDD: 5.6 CM
BH CV ECHO MEAS - LVIDS: 4.9 CM
BH CV ECHO MEAS - LVOT AREA: 3.2 CM2
BH CV ECHO MEAS - LVOT DIAM: 2 CM
BH CV ECHO MEAS - LVPWD: 1.22 CM
BH CV ECHO MEAS - MED PEAK E' VEL: 6.7 CM/SEC
BH CV ECHO MEAS - MV A DUR: 0.15 SEC
BH CV ECHO MEAS - MV A MAX VEL: 66.4 CM/SEC
BH CV ECHO MEAS - MV DEC SLOPE: 364.1 CM/SEC2
BH CV ECHO MEAS - MV DEC TIME: 0.17 SEC
BH CV ECHO MEAS - MV E MAX VEL: 70.7 CM/SEC
BH CV ECHO MEAS - MV E/A: 1.06
BH CV ECHO MEAS - MV MAX PG: 3 MMHG
BH CV ECHO MEAS - MV MEAN PG: 1.05 MMHG
BH CV ECHO MEAS - MV P1/2T: 56.8 MSEC
BH CV ECHO MEAS - MV V2 VTI: 21.2 CM
BH CV ECHO MEAS - MVA(P1/2T): 3.9 CM2
BH CV ECHO MEAS - MVA(VTI): 2.8 CM2
BH CV ECHO MEAS - PA ACC TIME: 0.15 SEC
BH CV ECHO MEAS - PA V2 MAX: 105.9 CM/SEC
BH CV ECHO MEAS - PULM A REVS DUR: 0.16 SEC
BH CV ECHO MEAS - PULM A REVS VEL: 50.1 CM/SEC
BH CV ECHO MEAS - PULM DIAS VEL: 36.8 CM/SEC
BH CV ECHO MEAS - PULM S/D: 0.81
BH CV ECHO MEAS - PULM SYS VEL: 30 CM/SEC
BH CV ECHO MEAS - QP/QS: 1.35
BH CV ECHO MEAS - RV MAX PG: 3.1 MMHG
BH CV ECHO MEAS - RV V1 MAX: 88.3 CM/SEC
BH CV ECHO MEAS - RV V1 VTI: 17.1 CM
BH CV ECHO MEAS - RVOT DIAM: 2.45 CM
BH CV ECHO MEAS - SV(LVOT): 59.9 ML
BH CV ECHO MEAS - SV(MOD-SP2): 39 ML
BH CV ECHO MEAS - SV(MOD-SP4): 47 ML
BH CV ECHO MEAS - SV(RVOT): 80.7 ML
BH CV ECHO MEASUREMENTS AVERAGE E/E' RATIO: 8.37
BH CV XLRA - RV BASE: 3.3 CM
BH CV XLRA - RV LENGTH: 8.1 CM
BH CV XLRA - RV MID: 4.5 CM
BH CV XLRA - TDI S': 14.9 CM/SEC
LEFT ATRIUM VOLUME INDEX: 15.5 ML/M2
SINUS: 3.3 CM
STJ: 2.7 CM

## 2024-08-31 PROCEDURE — 99239 HOSP IP/OBS DSCHRG MGMT >30: CPT | Performed by: NURSE PRACTITIONER

## 2024-08-31 PROCEDURE — 25510000001 PERFLUTREN 6.52 MG/ML SUSPENSION 2 ML VIAL: Performed by: STUDENT IN AN ORGANIZED HEALTH CARE EDUCATION/TRAINING PROGRAM

## 2024-08-31 PROCEDURE — 93306 TTE W/DOPPLER COMPLETE: CPT | Performed by: INTERNAL MEDICINE

## 2024-08-31 PROCEDURE — 93306 TTE W/DOPPLER COMPLETE: CPT

## 2024-08-31 RX ORDER — SPIRONOLACTONE 25 MG/1
25 TABLET ORAL DAILY
Qty: 30 TABLET | Refills: 11 | Status: SHIPPED | OUTPATIENT
Start: 2024-08-31

## 2024-08-31 RX ORDER — ASPIRIN 81 MG/1
81 TABLET ORAL DAILY
Qty: 30 TABLET | Refills: 11 | Status: SHIPPED | OUTPATIENT
Start: 2024-09-01

## 2024-08-31 RX ORDER — ATORVASTATIN CALCIUM 40 MG/1
40 TABLET, FILM COATED ORAL NIGHTLY
Qty: 90 TABLET | Refills: 3 | Status: SHIPPED | OUTPATIENT
Start: 2024-08-31

## 2024-08-31 RX ORDER — LISINOPRIL 10 MG/1
10 TABLET ORAL DAILY
Qty: 90 TABLET | Refills: 3 | Status: SHIPPED | OUTPATIENT
Start: 2024-08-31

## 2024-08-31 RX ORDER — METOPROLOL SUCCINATE 25 MG/1
25 TABLET, EXTENDED RELEASE ORAL DAILY
Qty: 30 TABLET | Refills: 11 | Status: SHIPPED | OUTPATIENT
Start: 2024-09-01

## 2024-08-31 RX ORDER — NITROGLYCERIN 0.4 MG/1
0.4 TABLET SUBLINGUAL
Qty: 25 TABLET | Refills: 1 | Status: SHIPPED | OUTPATIENT
Start: 2024-08-31

## 2024-08-31 RX ADMIN — TICAGRELOR 90 MG: 90 TABLET ORAL at 09:08

## 2024-08-31 RX ADMIN — PERFLUTREN 2 ML: 6.52 INJECTION, SUSPENSION INTRAVENOUS at 11:49

## 2024-08-31 RX ADMIN — ASPIRIN 81 MG: 81 TABLET, COATED ORAL at 09:08

## 2024-08-31 RX ADMIN — METOPROLOL SUCCINATE 25 MG: 25 TABLET, EXTENDED RELEASE ORAL at 09:08

## 2024-08-31 RX ADMIN — LISINOPRIL 10 MG: 10 TABLET ORAL at 09:08

## 2024-08-31 NOTE — CASE MANAGEMENT/SOCIAL WORK
Case Management Discharge Note      Final Note: home         Selected Continued Care - Discharged on 8/31/2024 Admission date: 8/29/2024 - Discharge disposition: Home or Self Care      Destination    No services have been selected for the patient.                Durable Medical Equipment    No services have been selected for the patient.                Dialysis/Infusion    No services have been selected for the patient.                Home Medical Care    No services have been selected for the patient.                Therapy    No services have been selected for the patient.                Community Resources    No services have been selected for the patient.                Community & DME    No services have been selected for the patient.                         Final Discharge Disposition Code: 01 - home or self-care

## 2024-08-31 NOTE — DISCHARGE SUMMARY
Patient Name: Marquise Murphy  :1960  63 y.o.    Date of Admit: 2024  Date of Discharge:  2024    Discharge Diagnosis:  1.  Anterior wall STEMI status post drug-eluting stent to the LAD  2.  Ischemic cardiomyopathy EF 35 to 40% on echocardiogram with wall motion abnormality  3.  Essential hypertension  4.  Tobacco use-willing to quit states smokes when he drinks alcohol which happens about 3 days a week  5.  Dyslipidemia    Hospital Course:   The patient is a 63-year-old male with history of hypertension, hyperlipidemia and hearing loss.  He also has a history of tobacco abuse he presented to the hospital with acute onset of chest pain he was found to have anterior ST elevation on EKG was taken for emergent coronary angiography.  Coronary anatomy revealed 100% occlusion of the proximal LAD.  He underwent successful IVUS guided PCI with a 3 x 38 mm Xience jonathon point drug-eluting stent.  He had no postprocedure complications he has been started on aspirin and Brilinta for dual antiplatelet therapy.  His home statin was increased from 20 to 40 mg.  He went for an echocardiogram today which confirmed his LV dysfunction and wall motion abnormality.  I started him on some spironolactone he we put him on metoprolol XL and continued his home lisinopril.  Will consider adding Jardiance or Farxiga at follow-up.  I discussed with him the need to stop smoking and abstain from alcohol.  He understands and agrees.  I also discussed his instructions and restrictions encouraged cardiac rehab and told him that he needs to be off work for at least 2 weeks at this time.    Procedures Performed  Procedure(s):  Left Heart Cath  Coronary angiography  Stent KATHY coronary  Percutaneous Coronary Intervention  Intravascular Ultrasound  Left ventriculography       Consults       No orders found from 2024 to 2024.            Pertinent Test Results:   Results from last 7 days   Lab Units 24  0412  08/29/24  1138   SODIUM mmol/L 134* 136   POTASSIUM mmol/L 4.2 4.2   CHLORIDE mmol/L 100 104   CO2 mmol/L 22.7 22.0   BUN mg/dL 14 18   CREATININE mg/dL 0.90 1.21   CALCIUM mg/dL 9.2 9.0   BILIRUBIN mg/dL  --  0.3   ALK PHOS U/L  --  67   ALT (SGPT) U/L  --  47*   AST (SGOT) U/L  --  207*   GLUCOSE mg/dL 138* 145*     Results from last 7 days   Lab Units 08/29/24  1601 08/29/24  1138   HSTROP T ng/L >10,000* 6,577*     @LABRCNT(bnp)@  Results from last 7 days   Lab Units 08/30/24  0411   WBC 10*3/mm3 12.55*   HEMOGLOBIN g/dL 14.4   HEMATOCRIT % 42.5   PLATELETS 10*3/mm3 172             Results from last 7 days   Lab Units 08/30/24  0411   CHOLESTEROL mg/dL 163   TRIGLYCERIDES mg/dL 155*   HDL CHOL mg/dL 37*   LDL CHOL mg/dL 99       Condition on Discharge: stable    Discharge Medications     Discharge Medications        New Medications        Instructions Start Date   Aspirin Low Dose 81 MG EC tablet  Generic drug: aspirin   81 mg, Oral, Daily   Start Date: September 1, 2024     Brilinta 90 MG tablet tablet  Generic drug: ticagrelor   90 mg, Oral, 2 Times Daily      metoprolol succinate XL 25 MG 24 hr tablet  Commonly known as: TOPROL-XL   25 mg, Oral, Daily   Start Date: September 1, 2024     nitroglycerin 0.4 MG SL tablet  Commonly known as: NITROSTAT   0.4 mg, Sublingual, Every 5 Minutes PRN, Take no more than 3 doses in 15 minutes.      spironolactone 25 MG tablet  Commonly known as: ALDACTONE   25 mg, Oral, Daily             Changes to Medications        Instructions Start Date   atorvastatin 40 MG tablet  Commonly known as: LIPITOR  What changed:   medication strength  how much to take  when to take this   40 mg, Oral, Nightly             Continue These Medications        Instructions Start Date   fluticasone 50 MCG/ACT nasal spray  Commonly known as: FLONASE   2 sprays, Nasal, Daily, Administer 2 sprays in each nostril for each dose.      lisinopril 10 MG tablet  Commonly known as: PRINIVIL,ZESTRIL   10 mg,  Oral, Daily               Discharge Diet:     Activity at Discharge:     Discharge disposition: home    Follow-up Appointments  Future Appointments   Date Time Provider Department Center   11/4/2024  9:30 AM Kip Mena MD [facility]   2/24/2025  9:30 AM Kip Mena MD [facility]     Additional Instructions for the Follow-ups that You Need to Schedule       Ambulatory Referral to Cardiac Rehab   As directed      Basic Metabolic Panel    Sep 07, 2024 (Approximate)      Release to patient: Routine Release                Test Results Pending at Discharge       ESTHELA Alejo, [facility]  08/31/24  15:06 EDT    Time: Discharge 40 min  Electronically signed by ESTHELA Alejo, 08/31/24, 3:06 PM EDT.

## 2024-08-31 NOTE — OUTREACH NOTE
Prep Survey      Flowsheet Row Responses   [facility] patient discharged from? [place]   Is LACE score < 7 ? Yes   Eligibility [facility]   Date of Admission 08/29/24   Date of Discharge 08/31/24   Discharge Disposition Home or Self Care   Discharge diagnosis Anterior wall STEMI status post drug-eluting stent to   Does the patient have one of the following disease processes/diagnoses(primary or secondary)? Acute MI (STEMI,NSTEMI)   Does the patient have Home health ordered? No   Is there a DME ordered? No   Prep survey completed? Yes            FAVIAN BABCOCK - Registered Nurse

## 2024-09-02 NOTE — PAYOR COMM NOTE
"Marquise Godoy \"Vega\" (63 y.o. Male)                                          ATTENTION; DISCHARGE CASE [id number]                                       REPLY TO [facility] DEPT  [phone]           Date of Birth   1960    Social Security Number       Address   [address] [place], [place] [address]    Home Phone   [phone]    MRN   [id number]       Roman Catholic   None    Marital Status   Single                            Admission Date   24    Admission Type   Emergency    Admitting Provider   Dillon Collazo MD    Attending Provider       Department, Room/Bed   [facility] CARDIOVASC UNIT,        Discharge Date   2024    Discharge Disposition   Home or Self Care    Discharge Destination                                 Attending Provider: (none)   Allergies: No Known Allergies    Isolation: None   Infection: None   Code Status: Prior    Ht: 182 cm (71.65\")   Wt: 117 kg (257 lb 15 oz)    Admission Cmt: None   Principal Problem: STEMI involving left anterior descending coronary artery [I21.02]                   Active Insurance as of 2024       Primary Coverage       Payor Plan Insurance Group Employer/Plan Group    [employer] BLUE CROSS AN[employer] BLUE CROSS BLUE SHIELD PPO [id number]       Payor Plan Address Payor Plan Phone Number Payor Plan Fax Number Effective Dates    [address] [phone]  2020 - None Entered    [place] [address]         Subscriber Name Subscriber Birth Date Member ID       MARQUISE GODOY 1960 [id number]                     Emergency Contacts        (Rel.) Home Phone Work Phone Mobile Phone    PhilGeri amato (Sister) -- -- [phone]                 Discharge Summary        Mely Caceres APRN at 24 1423          Patient Name: Marquise Godoy  :1960  63 y.o.    Date of Admit: 2024  Date of Discharge:  2024    Discharge Diagnosis:  1.  Anterior wall STEMI status post drug-eluting stent to " the LAD  2.  Ischemic cardiomyopathy EF 35 to 40% on echocardiogram with wall motion abnormality  3.  Essential hypertension  4.  Tobacco use-willing to quit states smokes when he drinks alcohol which happens about 3 days a week  5.  Dyslipidemia    Hospital Course:   The patient is a 63-year-old male with history of hypertension, hyperlipidemia and hearing loss.  He also has a history of tobacco abuse he presented to the hospital with acute onset of chest pain he was found to have anterior ST elevation on EKG was taken for emergent coronary angiography.  Coronary anatomy revealed 100% occlusion of the proximal LAD.  He underwent successful IVUS guided PCI with a 3 x 38 mm Xience jonathon point drug-eluting stent.  He had no postprocedure complications he has been started on aspirin and Brilinta for dual antiplatelet therapy.  His home statin was increased from 20 to 40 mg.  He went for an echocardiogram today which confirmed his LV dysfunction and wall motion abnormality.  I started him on some spironolactone he we put him on metoprolol XL and continued his home lisinopril.  Will consider adding Jardiance or Farxiga at follow-up.  I discussed with him the need to stop smoking and abstain from alcohol.  He understands and agrees.  I also discussed his instructions and restrictions encouraged cardiac rehab and told him that he needs to be off work for at least 2 weeks at this time.    Procedures Performed  Procedure(s):  Left Heart Cath  Coronary angiography  Stent KATHY coronary  Percutaneous Coronary Intervention  Intravascular Ultrasound  Left ventriculography       Consults       No orders found from 7/31/2024 to 8/30/2024.            Pertinent Test Results:   Results from last 7 days   Lab Units 08/30/24  0411 08/29/24  1138   SODIUM mmol/L 134* 136   POTASSIUM mmol/L 4.2 4.2   CHLORIDE mmol/L 100 104   CO2 mmol/L 22.7 22.0   BUN mg/dL 14 18   CREATININE mg/dL 0.90 1.21   CALCIUM mg/dL 9.2 9.0   BILIRUBIN mg/dL  --   0.3   ALK PHOS U/L  --  67   ALT (SGPT) U/L  --  47*   AST (SGOT) U/L  --  207*   GLUCOSE mg/dL 138* 145*     Results from last 7 days   Lab Units 08/29/24  1601 08/29/24  1138   HSTROP T ng/L >10,000* 6,577*     @LABRCNT(bnp)@  Results from last 7 days   Lab Units 08/30/24  0411   WBC 10*3/mm3 12.55*   HEMOGLOBIN g/dL 14.4   HEMATOCRIT % 42.5   PLATELETS 10*3/mm3 172             Results from last 7 days   Lab Units 08/30/24  0411   CHOLESTEROL mg/dL 163   TRIGLYCERIDES mg/dL 155*   HDL CHOL mg/dL 37*   LDL CHOL mg/dL 99       Condition on Discharge: stable    Discharge Medications     Discharge Medications        New Medications        Instructions Start Date   Aspirin Low Dose 81 MG EC tablet  Generic drug: aspirin   81 mg, Oral, Daily   Start Date: September 1, 2024     Brilinta 90 MG tablet tablet  Generic drug: ticagrelor   90 mg, Oral, 2 Times Daily      metoprolol succinate XL 25 MG 24 hr tablet  Commonly known as: TOPROL-XL   25 mg, Oral, Daily   Start Date: September 1, 2024     nitroglycerin 0.4 MG SL tablet  Commonly known as: NITROSTAT   0.4 mg, Sublingual, Every 5 Minutes PRN, Take no more than 3 doses in 15 minutes.      spironolactone 25 MG tablet  Commonly known as: ALDACTONE   25 mg, Oral, Daily             Changes to Medications        Instructions Start Date   atorvastatin 40 MG tablet  Commonly known as: LIPITOR  What changed:   medication strength  how much to take  when to take this   40 mg, Oral, Nightly             Continue These Medications        Instructions Start Date   fluticasone 50 MCG/ACT nasal spray  Commonly known as: FLONASE   2 sprays, Nasal, Daily, Administer 2 sprays in each nostril for each dose.      lisinopril 10 MG tablet  Commonly known as: PRINIVIL,ZESTRIL   10 mg, Oral, Daily               Discharge Diet:     Activity at Discharge:     Discharge disposition: home    Follow-up Appointments  Future Appointments   Date Time Provider Department Center   11/4/2024  9:30 AM  Kip Mena MD [facility]   2/24/2025  9:30 AM Kip Mena MD [facility]     Additional Instructions for the Follow-ups that You Need to Schedule       Ambulatory Referral to Cardiac Rehab   As directed      Basic Metabolic Panel    Sep 07, 2024 (Approximate)      Release to patient: Routine Release                Test Results Pending at Discharge       ESTHELA Alejo, [facility]  08/31/24  15:06 EDT    Time: Discharge 40 min  Electronically signed by ESTHLEA Alejo, 08/31/24, 3:06 PM EDT.      Electronically signed by Mely Caceres APRN at 08/31/24 [id number]

## 2024-09-03 ENCOUNTER — TRANSITIONAL CARE MANAGEMENT TELEPHONE ENCOUNTER (OUTPATIENT)
Dept: CALL CENTER | Facility: HOSPITAL | Age: 64
End: 2024-09-03
Payer: COMMERCIAL

## 2024-09-03 DIAGNOSIS — I21.02 STEMI INVOLVING LEFT ANTERIOR DESCENDING CORONARY ARTERY: Primary | ICD-10-CM

## 2024-09-03 NOTE — OUTREACH NOTE
Call Center TCM Note      Flowsheet Row Responses   [facility] patient discharged from? [place]   Does the patient have one of the following disease processes/diagnoses(primary or secondary)? Acute MI (STEMI,NSTEMI)   TCM attempt successful? Yes   Call start time 0916   Call end time 0922   Discharge diagnosis Anterior wall STEMI status post drug-eluting stent to   Meds reviewed with patient/caregiver? Yes   Is the patient having any side effects they believe may be caused by any medication additions or changes? No   Does the patient have all prescriptions related to this admission filled (includes statins,anticoagulants,HTN meds,anti-arrhythmia meds) Yes   Is the patient taking all medications as directed (includes completed medication regime)? Yes   Comments HOSP DC FU appt 9/5/24 930 am.   Does the patient have an appointment with their PCP within 7-14 days of discharge? Yes   Has home health visited the patient within 72 hours of discharge? N/A   Psychosocial issues? No   Did the patient receive a copy of their discharge instructions? Yes   Nursing interventions Reviewed instructions with patient   What is the patient's perception of their health status since discharge? Improving   Nursing interventions Nurse provided patient education   Is the patient/caregiver able to teach back signs and symptoms of when to call for help immediately: Sudden chest discomfort, Sudden discomfort in arms, back, neck or jaw, Shortness of breath at any time, Sudden sweating or clammy skin, Nausea or vomiting, Dizziness or lightheadedness, Irregular or rapid heart rate   Nursing interventions Nurse provided patient education   Is the pateint /caregiver able to teach back the importance of cardiac rehab? Yes   Nursing interventions Provided education on importance of cardiac rehab   Is the patient/caregiver able to teach back lifestyle changes to help prevent MIs Quit smoking, Heart healthy diet, Reducing stress   Is the  patient/caregiver able to teach back ways to prevent a second heart attack: Take medications, Follow up with MD, Participate in Cardiac Rehab   Is the patient/caregiver able to teach back the hierarchy of who to call/visit for symptoms/problems? PCP, Specialist, Home health nurse, Urgent Care, ED, 911 Yes   TCM call completed? Yes   Wrap up additional comments Pt reports he is doing ok. just tired. states Sites are fine, just a small raised area on wrist as it was in the hospital. Pt aware to call cardio if it get worse or has discoloration to hand.   Call end time 0922            Phyllis Lowe RN    9/3/2024, 09:23 EDT

## 2024-09-05 ENCOUNTER — OFFICE VISIT (OUTPATIENT)
Dept: FAMILY MEDICINE CLINIC | Age: 64
End: 2024-09-05
Payer: COMMERCIAL

## 2024-09-05 VITALS
DIASTOLIC BLOOD PRESSURE: 78 MMHG | HEART RATE: 76 BPM | BODY MASS INDEX: 33.54 KG/M2 | SYSTOLIC BLOOD PRESSURE: 125 MMHG | WEIGHT: 247.6 LBS | TEMPERATURE: 97.6 F | HEIGHT: 72 IN

## 2024-09-05 DIAGNOSIS — I10 ESSENTIAL HYPERTENSION: ICD-10-CM

## 2024-09-05 DIAGNOSIS — I25.10 CORONARY ARTERY DISEASE INVOLVING NATIVE CORONARY ARTERY OF NATIVE HEART WITHOUT ANGINA PECTORIS: Primary | ICD-10-CM

## 2024-09-05 PROBLEM — I21.02 STEMI INVOLVING LEFT ANTERIOR DESCENDING CORONARY ARTERY: Status: RESOLVED | Noted: 2024-08-29 | Resolved: 2024-09-05

## 2024-09-05 PROBLEM — Z00.00 ANNUAL PHYSICAL EXAM: Status: RESOLVED | Noted: 2023-08-14 | Resolved: 2024-09-05

## 2024-09-05 PROCEDURE — 99495 TRANSJ CARE MGMT MOD F2F 14D: CPT | Performed by: FAMILY MEDICINE

## 2024-09-05 NOTE — ASSESSMENT & PLAN NOTE
Coronary Artery Disease (OPTIONAL): Coronary artery disease is newly identified.  Continue current treatment regimen.  Cardiac status will be reassessed in 6 months  DISCHARGE SUMMARY REVIEWED, F/U WITH CARDIOLOGY AS PLANNED .

## 2024-09-05 NOTE — PROGRESS NOTES
Transitional Care Follow Up Visit  Subjective     Marquise Murphy is a 63 y.o. male who presents for a transitional care management visit.    Within 48 business hours after discharge our office contacted him via telephone to coordinate his care and needs.      I reviewed and discussed the details of that call along with the discharge summary, hospital problems, inpatient lab results, inpatient diagnostic studies, and consultation reports with Marquise.     Current outpatient and discharge medications have been reconciled for the patient.  Reviewed by: Kip Mena MD          8/31/2024     3:59 PM   Date of TCM Phone Call   [facility]   Date of Admission 8/29/2024   Date of Discharge 8/31/2024   Discharge Disposition Home or Self Care     Risk for Readmission (LACE) Score: 6 (8/31/2024  6:00 AM)      History of Present Illness  --TOLERATING BLOOD PRESSURE MEDICATION WITHOUT APPARENT SIDE EFFECTS  --ADMITTED WITH A STEMI AND UNDERWENT A CATH WITH STENTING OF THE LAD.  FEELS OK NOW JUST TIRED     Course During Hospital Stay:  IMPROVED     The following portions of the patient's history were reviewed and updated as appropriate: allergies, current medications, past family history, past medical history, past social history, past surgical history, and problem list.    Review of Systems   Constitutional:  Positive for fatigue. Negative for activity change, appetite change, chills and fever.   HENT:  Negative for congestion, ear pain and sneezing.    Eyes:  Negative for discharge.   Respiratory:  Negative for cough and shortness of breath.    Cardiovascular:  Negative for chest pain, palpitations and leg swelling.   Gastrointestinal:  Negative for abdominal pain, diarrhea, nausea and vomiting.   Genitourinary:  Negative for dysuria and hematuria.   Musculoskeletal:  Negative for arthralgias and myalgias.   Neurological:  Negative for headaches.   Psychiatric/Behavioral:  Negative for  "dysphoric mood.        Objective   /78 (BP Location: Left arm, Patient Position: Sitting)   Pulse 76   Temp 97.6 °F (36.4 °C) (Oral)   Ht 182.9 cm (72\")   Wt 112 kg (247 lb 9.6 oz)   BMI 33.58 kg/m²   Physical Exam  Vitals and nursing note reviewed.   Constitutional:       General: He is not in acute distress.     Appearance: Normal appearance.   Cardiovascular:      Rate and Rhythm: Normal rate and regular rhythm.      Heart sounds: Normal heart sounds. No murmur heard.  Pulmonary:      Effort: Pulmonary effort is normal.      Breath sounds: Normal breath sounds.   Abdominal:      Palpations: Abdomen is soft.      Tenderness: There is no abdominal tenderness.   Musculoskeletal:      Cervical back: Neck supple.      Right lower leg: No edema.      Left lower leg: No edema.   Lymphadenopathy:      Cervical: No cervical adenopathy.   Neurological:      General: No focal deficit present.      Mental Status: He is alert.      Cranial Nerves: No cranial nerve deficit.      Coordination: Coordination normal.      Gait: Gait normal.   Psychiatric:         Mood and Affect: Mood normal.         Behavior: Behavior normal.         Assessment & Plan   Diagnoses and all orders for this visit:    1. Coronary artery disease (stent) (Primary)  Assessment & Plan:  Coronary Artery Disease (OPTIONAL): Coronary artery disease is newly identified.  Continue current treatment regimen.  Cardiac status will be reassessed in 6 months  DISCHARGE SUMMARY REVIEWED, F/U WITH CARDIOLOGY AS PLANNED .      2. Essential hypertension  Assessment & Plan:  Hypertension is stable and controlled  Continue current treatment regimen.  Blood pressure will be reassessed in 6 months.                     "

## 2024-09-10 ENCOUNTER — READMISSION MANAGEMENT (OUTPATIENT)
Dept: CALL CENTER | Facility: HOSPITAL | Age: 64
End: 2024-09-10
Payer: COMMERCIAL

## 2024-09-10 NOTE — OUTREACH NOTE
AMI Week 2 Survey      Flowsheet Row Responses   [facility] patient discharged from? [place]   Does the patient have one of the following disease processes/diagnoses(primary or secondary)? Acute MI (STEMI,NSTEMI)   Week 2 attempt successful? Yes   Call start time 1932   Call end time 1935   Discharge diagnosis Anterior wall STEMI status post drug-eluting stent to   Meds reviewed with patient/caregiver? Yes   Is the patient having any side effects they believe may be caused by any medication additions or changes? No   Does the patient have all prescriptions related to this admission filled (includes statins,anticoagulants,HTN meds,anti-arrhythmia meds) Yes   Is the patient taking all medications as directed (includes completed medication regime)? Yes   Does the patient have a primary care provider?  Yes   Does the patient have an appointment with their PCP,cardiologist,or clinic within 7 days of discharge? Yes   Has the patient kept scheduled appointments due by today? Yes   Psychosocial issues? No   Did the patient receive a copy of their discharge instructions? Yes   Nursing interventions Reviewed instructions with patient   What is the patient's perception of their health status since discharge? Improving   Nursing interventions Nurse provided patient education   Is the patient/caregiver able to teach back signs and symptoms of when to call for help immediately: Sudden chest discomfort, Sudden discomfort in arms, back, neck or jaw, Sudden sweating or clammy skin, Shortness of breath at any time, Dizziness or lightheadedness, Nausea or vomiting, Irregular or rapid heart rate   Is the pateint /caregiver able to teach back the importance of cardiac rehab? Yes   Is the patient/caregiver able to teach back lifestyle changes to help prevent MIs Regular exercise as approved by provider, Heart healthy diet, Maintaining a healthy weight, Reducing stress, Limiting alcohol intake   Is the patient/caregiver able to  teach back ways to prevent a second heart attack: Take medications, Follow up with MD   If the patient is a current smoker, are they able to teach back resources for cessation? Not a smoker   Is the patient/caregiver able to teach back the hierarchy of who to call/visit for symptoms/problems? PCP, Specialist, Home health nurse, Urgent Care, ED, 911 Yes   Additional teach back comments States he is doing well and already seen his PCP.  He is going to start cardiac rehab.   Week 2 call completed? Yes   Graduated/Revoked comments No questions or needs at this time.   Call end time 1935            Radha HUSTON - Licensed Nurse

## 2024-09-11 ENCOUNTER — TELEPHONE (OUTPATIENT)
Dept: CARDIOLOGY | Facility: CLINIC | Age: 64
End: 2024-09-11
Payer: COMMERCIAL

## 2024-09-11 ENCOUNTER — OFFICE VISIT (OUTPATIENT)
Dept: CARDIAC REHAB | Facility: HOSPITAL | Age: 64
End: 2024-09-11
Payer: COMMERCIAL

## 2024-09-11 ENCOUNTER — OFFICE VISIT (OUTPATIENT)
Dept: CARDIOLOGY | Facility: CLINIC | Age: 64
End: 2024-09-11
Payer: COMMERCIAL

## 2024-09-11 ENCOUNTER — LAB (OUTPATIENT)
Dept: LAB | Facility: HOSPITAL | Age: 64
End: 2024-09-11
Payer: COMMERCIAL

## 2024-09-11 VITALS
OXYGEN SATURATION: 96 % | HEART RATE: 83 BPM | SYSTOLIC BLOOD PRESSURE: 120 MMHG | BODY MASS INDEX: 33.18 KG/M2 | WEIGHT: 245 LBS | DIASTOLIC BLOOD PRESSURE: 70 MMHG | HEIGHT: 72 IN

## 2024-09-11 DIAGNOSIS — I21.02 STEMI INVOLVING LEFT ANTERIOR DESCENDING CORONARY ARTERY: ICD-10-CM

## 2024-09-11 DIAGNOSIS — I21.02 ST ELEVATION MYOCARDIAL INFARCTION INVOLVING LEFT ANTERIOR DESCENDING (LAD) CORONARY ARTERY: Primary | ICD-10-CM

## 2024-09-11 DIAGNOSIS — I10 ESSENTIAL HYPERTENSION: ICD-10-CM

## 2024-09-11 DIAGNOSIS — Z95.5 S/P DRUG ELUTING CORONARY STENT PLACEMENT: ICD-10-CM

## 2024-09-11 DIAGNOSIS — I21.02 STEMI INVOLVING LEFT ANTERIOR DESCENDING CORONARY ARTERY: Primary | ICD-10-CM

## 2024-09-11 LAB
ANION GAP SERPL CALCULATED.3IONS-SCNC: 10.7 MMOL/L (ref 5–15)
BUN SERPL-MCNC: 18 MG/DL (ref 8–23)
BUN/CREAT SERPL: 17.6 (ref 7–25)
CALCIUM SPEC-SCNC: 10.1 MG/DL (ref 8.6–10.5)
CHLORIDE SERPL-SCNC: 100 MMOL/L (ref 98–107)
CO2 SERPL-SCNC: 26.3 MMOL/L (ref 22–29)
CREAT SERPL-MCNC: 1.02 MG/DL (ref 0.76–1.27)
EGFRCR SERPLBLD CKD-EPI 2021: 82.6 ML/MIN/1.73
GLUCOSE SERPL-MCNC: 109 MG/DL (ref 65–99)
POTASSIUM SERPL-SCNC: 4.4 MMOL/L (ref 3.5–5.2)
SODIUM SERPL-SCNC: 137 MMOL/L (ref 136–145)

## 2024-09-11 PROCEDURE — 80048 BASIC METABOLIC PNL TOTAL CA: CPT

## 2024-09-11 PROCEDURE — 36415 COLL VENOUS BLD VENIPUNCTURE: CPT

## 2024-09-11 PROCEDURE — 93797 PHYS/QHP OP CAR RHAB WO ECG: CPT

## 2024-09-11 PROCEDURE — 93798 PHYS/QHP OP CAR RHAB W/ECG: CPT

## 2024-09-11 RX ORDER — DAPAGLIFLOZIN 10 MG/1
10 TABLET, FILM COATED ORAL DAILY
Qty: 30 TABLET | Refills: 11 | Status: SHIPPED | OUTPATIENT
Start: 2024-09-11

## 2024-09-11 RX ORDER — SPIRONOLACTONE 25 MG/1
25 TABLET ORAL DAILY
Qty: 90 TABLET | Refills: 3 | Status: SHIPPED | OUTPATIENT
Start: 2024-09-11

## 2024-09-11 RX ORDER — ATORVASTATIN CALCIUM 40 MG/1
40 TABLET, FILM COATED ORAL NIGHTLY
Qty: 90 TABLET | Refills: 3 | Status: SHIPPED | OUTPATIENT
Start: 2024-09-11

## 2024-09-11 RX ORDER — LISINOPRIL 10 MG/1
10 TABLET ORAL DAILY
Qty: 90 TABLET | Refills: 3 | Status: SHIPPED | OUTPATIENT
Start: 2024-09-11

## 2024-09-11 RX ORDER — METOPROLOL SUCCINATE 25 MG/1
25 TABLET, EXTENDED RELEASE ORAL DAILY
Qty: 90 TABLET | Refills: 3 | Status: SHIPPED | OUTPATIENT
Start: 2024-09-11

## 2024-09-11 NOTE — TELEPHONE ENCOUNTER
Notified Marquise Murphy Jr. of results, patient verbalizes understanding.    Jazz Garza RN  [facility] Triage  09/11/24 15:31 EDT

## 2024-09-11 NOTE — TELEPHONE ENCOUNTER
----- Message from Mely Caceres sent at 9/11/2024  1:20 PM EDT -----  Let patient know lab is normal  ----- Message -----  From: Lab, Background User  Sent: 9/11/2024  12:52 PM EDT  To: Mely Caceres, APRN

## 2024-09-11 NOTE — TELEPHONE ENCOUNTER
I tried to call Marquise Murphy Jr. but there was no answer.  Left a voicemail asking patient to call back.  Will continue to try to reach pt.    HUB- if pt calls back, please transfer through to triage.    Thank you,    Marcy CHOWDARY RN  Triage [facility]  09/11/24 13:27 EDT

## 2024-09-11 NOTE — PROGRESS NOTES
Date of Office Visit: 2024  Encounter Provider: ESTHELA Alejo  Place of Service: [facility]  Patient Name: Marquise Murphy  :1960    Chief complaint: Coronary artery disease    HPI: Marquise Murphy is a 63 y.o. male who is a patient of  Dr. Collazo and is new to me today.  He has a history of hypertension, hyperlipidemia and hearing loss.  He also has a history of tobacco abuse.  He presented to the hospital with acute onset of chest pain on .  He was having an anterior ST elevation MI and was taken for emergent coronary angiography.  Coronary anatomy revealed 100% occlusion of the proximal LAD he had a successful ultrasound-guided PCI with 3 x 38 mm Xience jonathon point drug-eluting stent.  Postprocedure he was put on dual antiplatelet therapy with aspirin and Brilinta we put him on some spironolactone and metoprolol and continued his home atenolol.  His ejection fraction was down at 35 to 40%.    He comes in today for follow-up he denies any chest pain, pressure or tightness.  He is a little fatigued and gets a little bit of shortness of breath when he exerts himself.  He is not smoking.  He states that he really only smoked when he was drinking alcohol and he has not been drinking alcohol.  He starts cardiac rehab today.  He is tolerating medications well.    Previous testing and notes have been reviewed by me.   Past Medical History:   Diagnosis Date    Asthma FOR YEARS    HARD TO BREATH THRU NOSE    HL (hearing loss) FOR YEARS IN RIGHT EAR    GOES IN AND OUT    Visual impairment        Past Surgical History:   Procedure Laterality Date    CARDIAC CATHETERIZATION N/A 2024    Procedure: Left Heart Cath;  Surgeon: Dillon Collazo MD;  Location:  [facility];  Service: Cardiovascular;  Laterality: N/A;    CARDIAC CATHETERIZATION N/A 2024    Procedure: Coronary angiography;  Surgeon: Dillon Collazo MD;  Location: [facility]  INV[facility] LOCATION;  Service: Cardiovascular;  Laterality: N/A;    CARDIAC CATHETERIZATION N/A 2024    Procedure: Stent KATHY coronary;  Surgeon: Dillon Collazo MD;  Location:  [facility];  Service: Cardiovascular;  Laterality: N/A;    CARDIAC CATHETERIZATION N/A 2024    Procedure: Percutaneous Coronary Intervention;  Surgeon: Dillon Collazo MD;  Location:  [facility];  Service: Cardiovascular;  Laterality: N/A;    CARDIAC CATHETERIZATION N/A 2024    Procedure: Left ventriculography;  Surgeon: Dillon Collazo MD;  Location:  [facility];  Service: Cardiovascular;  Laterality: N/A;    COLONOSCOPY      POLYPS    EYE ENUCLEATION Right     FROM TRAUMA    FACIAL FRACTURE SURGERY      INTERVENTIONAL RADIOLOGY PROCEDURE N/A 2024    Procedure: Intravascular Ultrasound;  Surgeon: Dillon Collazo MD;  Location:  [facility];  Service: Cardiovascular;  Laterality: N/A;    TONSILLECTOMY         Social History     Socioeconomic History    Marital status: Single   Tobacco Use    Smoking status: Former     Current packs/day: 0.00     Average packs/day: 0.5 packs/day for 42.0 years (21.0 ttl pk-yrs)     Types: Cigarettes     Start date: 10/1/1981     Quit date: 10/2023     Years since quittin.9     Passive exposure: Past    Smokeless tobacco: Never   Vaping Use    Vaping status: Never Used   Substance and Sexual Activity    Alcohol use: Yes    Drug use: Never    Sexual activity: Not Currently     Partners: Female       Family History   Problem Relation Age of Onset    Diabetes Mother     Heart disease Father        Review of Systems   Constitutional: Negative for diaphoresis and malaise/fatigue.   Cardiovascular:  Negative for chest pain, claudication, dyspnea on exertion, irregular heartbeat, leg swelling, near-syncope, orthopnea, palpitations, paroxysmal nocturnal dyspnea and syncope.   Respiratory:  Negative for cough, shortness of breath and sleep  "disturbances due to breathing.    Musculoskeletal:  Negative for falls.   Neurological:  Negative for dizziness and weakness.   Psychiatric/Behavioral:  Negative for altered mental status and substance abuse.        No Known Allergies      Current Outpatient Medications:     aspirin 81 MG EC tablet, Take 1 tablet by mouth Daily., Disp: 30 tablet, Rfl: 11    atorvastatin (LIPITOR) 40 MG tablet, Take 1 tablet by mouth Every Night., Disp: 90 tablet, Rfl: 3    fluticasone (FLONASE) 50 MCG/ACT nasal spray, 2 sprays into the nostril(s) as directed by provider Daily. Administer 2 sprays in each nostril for each dose., Disp: 16 g, Rfl: 6    lisinopril (PRINIVIL,ZESTRIL) 10 MG tablet, Take 1 tablet by mouth Daily., Disp: 90 tablet, Rfl: 3    metoprolol succinate XL (TOPROL-XL) 25 MG 24 hr tablet, Take 1 tablet by mouth Daily., Disp: 30 tablet, Rfl: 11    nitroglycerin (NITROSTAT) 0.4 MG SL tablet, Place 1 tablet under the tongue Every 5 (Five) Minutes As Needed for Chest Pain (Systolic BP Greater Than 100). Take no more than 3 doses in 15 minutes., Disp: 25 tablet, Rfl: 1    spironolactone (ALDACTONE) 25 MG tablet, Take 1 tablet by mouth Daily., Disp: 30 tablet, Rfl: 11    ticagrelor (BRILINTA) 90 MG tablet tablet, Take 1 tablet by mouth 2 (Two) Times a Day., Disp: 60 tablet, Rfl: 11      Objective:     Vitals:    09/11/24 1003   BP: 120/70   BP Location: Left arm   Patient Position: Sitting   Pulse: 83   SpO2: 96%   Weight: 111 kg (245 lb)   Height: 182.9 cm (72\")     Body mass index is 33.23 kg/m².    PHYSICAL EXAM:    Constitutional:       General: Not in acute distress.     Appearance: Normal appearance. Well-developed.   Eyes:      Pupils: Pupils are equal, round, and reactive to light.   HENT:      Head: Normocephalic.   Neck:      Vascular: No carotid bruit or JVD.   Pulmonary:      Effort: Pulmonary effort is normal. No tachypnea.      Breath sounds: Normal breath sounds. No wheezing. No rales.   Cardiovascular:      " Normal rate. Regular rhythm.      No gallop.       Comments: Right radial cath site stable  Pulses:     Intact distal pulses.   Edema:     Peripheral edema absent.   Abdominal:      General: Bowel sounds are normal.      Palpations: Abdomen is soft.      Tenderness: There is no abdominal tenderness.   Musculoskeletal: Normal range of motion.      Cervical back: Normal range of motion and neck supple. No edema. Skin:     General: Skin is warm and dry.   Neurological:      Mental Status: Alert and oriented to person, place, and time.           ECG 12 Lead    Date/Time: 9/11/2024 10:22 AM  Performed by: Mely Caceres APRN    Authorized by: Mely Caceres APRN  Comparison: compared with previous ECG from 8/29/2024  Similar to previous ECG  Rhythm: sinus rhythm  Rate: normal  Q waves: V4, V3 and V2    QRS axis: normal    Clinical impression: abnormal EKG          Assessment:        1.  Ischemic cardiomyopathy-add Farxiga to guideline directed medical therapy tolerating well plan to get labs done today since being started on spironolactone    2.  Anterior wall ST elevation MI status post drug-eluting stent to the LAD.  On dual antiplatelet therapy with aspirin and Brilinta no angina symptoms EKG stable    3.  Essential hypertension controlled on current regimen    4.  Ex-smoker    5.  Dyslipidemia goal LDL less than 70 continue with high-dose statin      Plan:       Follow-up on October 11 as scheduled.         Your medication list            Accurate as of September 11, 2024 10:15 AM. If you have any questions, ask your nurse or doctor.                CONTINUE taking these medications        Instructions Last Dose Given Next Dose Due   Aspirin Low Dose 81 MG EC tablet  Generic drug: aspirin      Take 1 tablet by mouth Daily.       atorvastatin 40 MG tablet  Commonly known as: LIPITOR      Take 1 tablet by mouth Every Night.       Brilinta 90 MG tablet tablet  Generic drug: ticagrelor      Take 1 tablet by mouth  2 (Two) Times a Day.       fluticasone 50 MCG/ACT nasal spray  Commonly known as: FLONASE      2 sprays into the nostril(s) as directed by provider Daily. Administer 2 sprays in each nostril for each dose.       lisinopril 10 MG tablet  Commonly known as: PRINIVIL,ZESTRIL      Take 1 tablet by mouth Daily.       metoprolol succinate XL 25 MG 24 hr tablet  Commonly known as: TOPROL-XL      Take 1 tablet by mouth Daily.       nitroglycerin 0.4 MG SL tablet  Commonly known as: NITROSTAT      Place 1 tablet under the tongue Every 5 (Five) Minutes As Needed for Chest Pain (Systolic BP Greater Than 100). Take no more than 3 doses in 15 minutes.       spironolactone 25 MG tablet  Commonly known as: ALDACTONE      Take 1 tablet by mouth Daily.                  As always, it has been a pleasure to participate in your patient's care.      Sincerely,     Mely PROCTOR

## 2024-09-16 ENCOUNTER — TREATMENT (OUTPATIENT)
Dept: CARDIAC REHAB | Facility: HOSPITAL | Age: 64
End: 2024-09-16
Payer: COMMERCIAL

## 2024-09-16 DIAGNOSIS — I21.02 ST ELEVATION MYOCARDIAL INFARCTION INVOLVING LEFT ANTERIOR DESCENDING (LAD) CORONARY ARTERY: Primary | ICD-10-CM

## 2024-09-16 PROCEDURE — 93798 PHYS/QHP OP CAR RHAB W/ECG: CPT

## 2024-09-17 ENCOUNTER — TELEPHONE (OUTPATIENT)
Dept: CARDIOLOGY | Facility: CLINIC | Age: 64
End: 2024-09-17
Payer: COMMERCIAL

## 2024-09-18 ENCOUNTER — TREATMENT (OUTPATIENT)
Dept: CARDIAC REHAB | Facility: HOSPITAL | Age: 64
End: 2024-09-18
Payer: COMMERCIAL

## 2024-09-18 DIAGNOSIS — I21.02 ST ELEVATION MYOCARDIAL INFARCTION INVOLVING LEFT ANTERIOR DESCENDING (LAD) CORONARY ARTERY: Primary | ICD-10-CM

## 2024-09-18 PROCEDURE — 93798 PHYS/QHP OP CAR RHAB W/ECG: CPT

## 2024-09-20 ENCOUNTER — TREATMENT (OUTPATIENT)
Dept: CARDIAC REHAB | Facility: HOSPITAL | Age: 64
End: 2024-09-20
Payer: COMMERCIAL

## 2024-09-20 DIAGNOSIS — I21.02 ST ELEVATION MYOCARDIAL INFARCTION INVOLVING LEFT ANTERIOR DESCENDING (LAD) CORONARY ARTERY: Primary | ICD-10-CM

## 2024-09-20 PROCEDURE — 93798 PHYS/QHP OP CAR RHAB W/ECG: CPT

## 2024-09-23 ENCOUNTER — TREATMENT (OUTPATIENT)
Dept: CARDIAC REHAB | Facility: HOSPITAL | Age: 64
End: 2024-09-23
Payer: COMMERCIAL

## 2024-09-23 DIAGNOSIS — I21.02 ST ELEVATION MYOCARDIAL INFARCTION INVOLVING LEFT ANTERIOR DESCENDING (LAD) CORONARY ARTERY: Primary | ICD-10-CM

## 2024-09-23 PROCEDURE — 93798 PHYS/QHP OP CAR RHAB W/ECG: CPT

## 2024-09-25 ENCOUNTER — TREATMENT (OUTPATIENT)
Dept: CARDIAC REHAB | Facility: HOSPITAL | Age: 64
End: 2024-09-25
Payer: COMMERCIAL

## 2024-09-25 DIAGNOSIS — I21.02 ST ELEVATION MYOCARDIAL INFARCTION INVOLVING LEFT ANTERIOR DESCENDING (LAD) CORONARY ARTERY: Primary | ICD-10-CM

## 2024-09-25 PROCEDURE — 93798 PHYS/QHP OP CAR RHAB W/ECG: CPT

## 2024-09-27 ENCOUNTER — TREATMENT (OUTPATIENT)
Dept: CARDIAC REHAB | Facility: HOSPITAL | Age: 64
End: 2024-09-27
Payer: COMMERCIAL

## 2024-09-27 DIAGNOSIS — Z95.5 S/P DRUG ELUTING CORONARY STENT PLACEMENT: Primary | ICD-10-CM

## 2024-09-27 PROCEDURE — 93798 PHYS/QHP OP CAR RHAB W/ECG: CPT

## 2024-09-30 ENCOUNTER — TREATMENT (OUTPATIENT)
Dept: CARDIAC REHAB | Facility: HOSPITAL | Age: 64
End: 2024-09-30
Payer: COMMERCIAL

## 2024-09-30 DIAGNOSIS — Z95.5 S/P DRUG ELUTING CORONARY STENT PLACEMENT: Primary | ICD-10-CM

## 2024-09-30 PROCEDURE — 93798 PHYS/QHP OP CAR RHAB W/ECG: CPT

## 2024-10-02 ENCOUNTER — TREATMENT (OUTPATIENT)
Dept: CARDIAC REHAB | Facility: HOSPITAL | Age: 64
End: 2024-10-02
Payer: COMMERCIAL

## 2024-10-02 DIAGNOSIS — I21.02 ST ELEVATION MYOCARDIAL INFARCTION INVOLVING LEFT ANTERIOR DESCENDING (LAD) CORONARY ARTERY: Primary | ICD-10-CM

## 2024-10-02 PROCEDURE — 93798 PHYS/QHP OP CAR RHAB W/ECG: CPT

## 2024-10-04 ENCOUNTER — TREATMENT (OUTPATIENT)
Dept: CARDIAC REHAB | Facility: HOSPITAL | Age: 64
End: 2024-10-04
Payer: COMMERCIAL

## 2024-10-04 DIAGNOSIS — I21.02 ST ELEVATION MYOCARDIAL INFARCTION INVOLVING LEFT ANTERIOR DESCENDING (LAD) CORONARY ARTERY: Primary | ICD-10-CM

## 2024-10-04 PROCEDURE — 93798 PHYS/QHP OP CAR RHAB W/ECG: CPT

## 2024-10-07 ENCOUNTER — TREATMENT (OUTPATIENT)
Dept: CARDIAC REHAB | Facility: HOSPITAL | Age: 64
End: 2024-10-07
Payer: COMMERCIAL

## 2024-10-07 DIAGNOSIS — I21.02 ST ELEVATION MYOCARDIAL INFARCTION INVOLVING LEFT ANTERIOR DESCENDING (LAD) CORONARY ARTERY: Primary | ICD-10-CM

## 2024-10-07 PROCEDURE — 93798 PHYS/QHP OP CAR RHAB W/ECG: CPT

## 2024-10-09 ENCOUNTER — TREATMENT (OUTPATIENT)
Dept: CARDIAC REHAB | Facility: HOSPITAL | Age: 64
End: 2024-10-09
Payer: COMMERCIAL

## 2024-10-09 DIAGNOSIS — Z95.5 S/P DRUG ELUTING CORONARY STENT PLACEMENT: Primary | ICD-10-CM

## 2024-10-09 PROCEDURE — 93798 PHYS/QHP OP CAR RHAB W/ECG: CPT

## 2024-10-11 ENCOUNTER — TREATMENT (OUTPATIENT)
Dept: CARDIAC REHAB | Facility: HOSPITAL | Age: 64
End: 2024-10-11
Payer: COMMERCIAL

## 2024-10-11 ENCOUNTER — OFFICE VISIT (OUTPATIENT)
Age: 64
End: 2024-10-11
Payer: COMMERCIAL

## 2024-10-11 VITALS
BODY MASS INDEX: 31.97 KG/M2 | HEART RATE: 83 BPM | DIASTOLIC BLOOD PRESSURE: 70 MMHG | HEIGHT: 72 IN | SYSTOLIC BLOOD PRESSURE: 126 MMHG | WEIGHT: 236 LBS | OXYGEN SATURATION: 96 %

## 2024-10-11 DIAGNOSIS — I50.22 CHRONIC HFREF (HEART FAILURE WITH REDUCED EJECTION FRACTION): ICD-10-CM

## 2024-10-11 DIAGNOSIS — I21.02 ST ELEVATION MYOCARDIAL INFARCTION INVOLVING LEFT ANTERIOR DESCENDING (LAD) CORONARY ARTERY: Primary | ICD-10-CM

## 2024-10-11 DIAGNOSIS — I25.10 CORONARY ARTERY DISEASE INVOLVING NATIVE CORONARY ARTERY OF NATIVE HEART WITHOUT ANGINA PECTORIS: Primary | ICD-10-CM

## 2024-10-11 PROCEDURE — 93798 PHYS/QHP OP CAR RHAB W/ECG: CPT

## 2024-10-11 NOTE — PROGRESS NOTES
Subjective:     Encounter Date:10/11/2024      Patient ID: Marquise Murphy Jr. is a 63 y.o. male.    Chief Complaint:  Follow-up CAD, HFrEF    HPI:   63 y.o. male  with hypertension, hyperlipidemia, hearing loss, recent anterior STEMI status post PCI to proximal LAD, HFrEF who presents for follow-up. Patient had an anterior STEMI in late August and received successful IVUS guided PCI with a 3.0 x 38mm Xience Skypoint drug-eluting stent (postdilated proximally with a 4.0 mm NC and distally with a 3.0 mm NC.) Left ventriculography revealed mid to distal anterior wall and apical hypokinesis.  Echocardiogram revealed an EF of 35 to 40% with hypokinesis of the mid anterior wall and akinesis of the apex.  He followed up with Mely Caceres in mid September and was started on spironolactone.  Unfortunately this led to hypotension with some associated dizziness so he stopped this.  He has been going to cardiac rehab and has been improving each time.  He currently denies any chest pain or dyspnea on exertion.       The following portions of the patient's history were reviewed and updated as appropriate: allergies, current medications, past family history, past medical history, past social history, past surgical history and problem list.     REVIEW OF SYSTEMS:   All systems reviewed.  Pertinent positives identified in HPI.  All other systems are negative.    Past Medical History:   Diagnosis Date    Asthma FOR YEARS    HARD TO BREATH THRU NOSE    HL (hearing loss) FOR YEARS IN RIGHT EAR    GOES IN AND OUT    Visual impairment        Family History   Problem Relation Age of Onset    Diabetes Mother     Heart disease Father        Social History     Socioeconomic History    Marital status: Single   Tobacco Use    Smoking status: Former     Current packs/day: 0.00     Average packs/day: 0.5 packs/day for 42.0 years (21.0 ttl pk-yrs)     Types: Cigarettes     Start date: 10/1/1981     Quit date: 10/2023     Years since  quittin.0     Passive exposure: Past    Smokeless tobacco: Never   Vaping Use    Vaping status: Never Used   Substance and Sexual Activity    Alcohol use: Yes    Drug use: Never    Sexual activity: Not Currently     Partners: Female       No Known Allergies    Past Surgical History:   Procedure Laterality Date    CARDIAC CATHETERIZATION N/A 2024    Procedure: Left Heart Cath;  Surgeon: Dillon Collazo MD;  Location:  [facility];  Service: Cardiovascular;  Laterality: N/A;    CARDIAC CATHETERIZATION N/A 2024    Procedure: Coronary angiography;  Surgeon: Dillon Collazo MD;  Location:  [facility];  Service: Cardiovascular;  Laterality: N/A;    CARDIAC CATHETERIZATION N/A 2024    Procedure: Stent KATHY coronary;  Surgeon: Dillon Collazo MD;  Location:  [facility];  Service: Cardiovascular;  Laterality: N/A;    CARDIAC CATHETERIZATION N/A 2024    Procedure: Percutaneous Coronary Intervention;  Surgeon: Dillon Collazo MD;  Location:  [facility];  Service: Cardiovascular;  Laterality: N/A;    CARDIAC CATHETERIZATION N/A 2024    Procedure: Left ventriculography;  Surgeon: Dillon Collazo MD;  Location:  [facility];  Service: Cardiovascular;  Laterality: N/A;    COLONOSCOPY      POLYPS    EYE ENUCLEATION Right     FROM TRAUMA    FACIAL FRACTURE SURGERY      INTERVENTIONAL RADIOLOGY PROCEDURE N/A 2024    Procedure: Intravascular Ultrasound;  Surgeon: Dillon Collazo MD;  Location:  [facility];  Service: Cardiovascular;  Laterality: N/A;    TONSILLECTOMY         Procedures       Objective:         Vitals:    10/11/24 0947   BP: 126/70   Pulse: 83   SpO2: 96%       PHYSICAL EXAM:  GEN: well appearing, in NAD   HEENT: NCAT, EOMI, moist mucus membranes   Respiratory: CTAB, no wheezes, rales or rhonchi  CV: normal rate, regular rhythm, normal S1, S2, no murmurs, rubs, gallops, +2 radial pulses b/l  GI: soft,  nontender, nondistended  MSK: no edema  Skin: no rash, warm, dry  Heme/Lymph: no bruising or bleeding  Neuro: Alert and Oriented x 3, grossly normal motor function        Assessment:         (I25.10) Coronary artery disease involving native coronary artery of native heart without angina pectoris    (I50.22) Chronic HFrEF (heart failure with reduced ejection fraction)    63 y.o. male  with hypertension, hyperlipidemia, hearing loss, recent anterior STEMI status post PCI to proximal LAD, HFrEF who presents for follow-up.       Plan:       #HFrEF, stage C, NYHA I  EF 36 to 40%.  Currently asymptomatic.  Unable to tolerate spironolactone due to dizziness and hypotension.  - Continue Toprol 25 mg daily, Farxiga 10 mg daily  - We will attempt switch of lisinopril to low-dose Entresto 24-26 mg twice daily (samples given, if he is able to tolerate, he will let us know and we will send in a prescription)  -Plan to repeat echocardiogram for reassessment of EF after 3 months of maximally tolerated GDMT    #CAD  As above, anterior STEMI in late August requiring PCI to the proximal LAD with a 3.0 x 38mm Xience Skypoint drug-eluting stent (postdilated proximally with a 4.0 mm NC and distally with a 3.0 mm NC.)  -Continue aspirin 81 mg daily, ticagrelor 90 mg twice daily, atorvastatin 40 mg daily    Kip Mena MD, thank you very much for referring this kind patient to me. Please call me with any questions or concerns. I will see the patient again in the office in 6 weeks or earlier as needed.         Dillon Cortez MD, [facility], [facility]  10/11/24  [facility]    Outpatient Encounter Medications as of 10/11/2024   Medication Sig Dispense Refill    aspirin 81 MG EC tablet Take 1 tablet by mouth Daily. 30 tablet 11    atorvastatin (LIPITOR) 40 MG tablet Take 1 tablet by mouth Every Night. 90 tablet 3    dapagliflozin Propanediol (Farxiga) 10 MG tablet Take 10 mg by mouth Daily. Do NOT SUBSTITUTE FOR GENERIC 30 tablet 11     fluticasone (FLONASE) 50 MCG/ACT nasal spray 2 sprays into the nostril(s) as directed by provider Daily. Administer 2 sprays in each nostril for each dose. 16 g 6    metoprolol succinate XL (TOPROL-XL) 25 MG 24 hr tablet Take 1 tablet by mouth Daily. 90 tablet 3    nitroglycerin (NITROSTAT) 0.4 MG SL tablet Place 1 tablet under the tongue Every 5 (Five) Minutes As Needed for Chest Pain (Systolic BP Greater Than 100). Take no more than 3 doses in 15 minutes. 25 tablet 1    ticagrelor (BRILINTA) 90 MG tablet tablet Take 1 tablet by mouth 2 (Two) Times a Day. 60 tablet 11    [DISCONTINUED] lisinopril (PRINIVIL,ZESTRIL) 10 MG tablet Take 1 tablet by mouth Daily. 90 tablet 3    [DISCONTINUED] spironolactone (ALDACTONE) 25 MG tablet Take 1 tablet by mouth Daily. 90 tablet 3     No facility-administered encounter medications on file as of 10/11/2024.

## 2024-10-14 ENCOUNTER — APPOINTMENT (OUTPATIENT)
Dept: CARDIAC REHAB | Facility: HOSPITAL | Age: 64
End: 2024-10-14
Payer: COMMERCIAL

## 2024-10-16 ENCOUNTER — TREATMENT (OUTPATIENT)
Dept: CARDIAC REHAB | Facility: HOSPITAL | Age: 64
End: 2024-10-16
Payer: COMMERCIAL

## 2024-10-16 DIAGNOSIS — I21.02 ST ELEVATION MYOCARDIAL INFARCTION INVOLVING LEFT ANTERIOR DESCENDING (LAD) CORONARY ARTERY: Primary | ICD-10-CM

## 2024-10-16 PROCEDURE — 93798 PHYS/QHP OP CAR RHAB W/ECG: CPT

## 2024-10-18 ENCOUNTER — TREATMENT (OUTPATIENT)
Dept: CARDIAC REHAB | Facility: HOSPITAL | Age: 64
End: 2024-10-18
Payer: COMMERCIAL

## 2024-10-18 DIAGNOSIS — Z95.5 S/P DRUG ELUTING CORONARY STENT PLACEMENT: Primary | ICD-10-CM

## 2024-10-18 PROCEDURE — 93798 PHYS/QHP OP CAR RHAB W/ECG: CPT

## 2024-10-21 ENCOUNTER — TREATMENT (OUTPATIENT)
Dept: CARDIAC REHAB | Facility: HOSPITAL | Age: 64
End: 2024-10-21
Payer: COMMERCIAL

## 2024-10-21 DIAGNOSIS — Z95.5 S/P DRUG ELUTING CORONARY STENT PLACEMENT: Primary | ICD-10-CM

## 2024-10-21 PROCEDURE — 93798 PHYS/QHP OP CAR RHAB W/ECG: CPT

## 2024-10-23 ENCOUNTER — APPOINTMENT (OUTPATIENT)
Dept: CARDIAC REHAB | Facility: HOSPITAL | Age: 64
End: 2024-10-23
Payer: COMMERCIAL

## 2024-10-25 ENCOUNTER — TREATMENT (OUTPATIENT)
Dept: CARDIAC REHAB | Facility: HOSPITAL | Age: 64
End: 2024-10-25
Payer: COMMERCIAL

## 2024-10-25 DIAGNOSIS — I21.02 ST ELEVATION MYOCARDIAL INFARCTION INVOLVING LEFT ANTERIOR DESCENDING (LAD) CORONARY ARTERY: Primary | ICD-10-CM

## 2024-10-25 PROCEDURE — 93798 PHYS/QHP OP CAR RHAB W/ECG: CPT

## 2024-10-28 ENCOUNTER — TREATMENT (OUTPATIENT)
Dept: CARDIAC REHAB | Facility: HOSPITAL | Age: 64
End: 2024-10-28
Payer: COMMERCIAL

## 2024-10-28 DIAGNOSIS — Z95.5 S/P DRUG ELUTING CORONARY STENT PLACEMENT: Primary | ICD-10-CM

## 2024-10-28 PROCEDURE — 93798 PHYS/QHP OP CAR RHAB W/ECG: CPT

## 2024-10-29 RX ORDER — LOSARTAN POTASSIUM 25 MG/1
25 TABLET ORAL DAILY
Qty: 90 TABLET | Refills: 3 | Status: SHIPPED | OUTPATIENT
Start: 2024-10-29

## 2024-10-30 ENCOUNTER — APPOINTMENT (OUTPATIENT)
Dept: CARDIAC REHAB | Facility: HOSPITAL | Age: 64
End: 2024-10-30
Payer: COMMERCIAL

## 2024-11-04 ENCOUNTER — LAB (OUTPATIENT)
Dept: LAB | Facility: HOSPITAL | Age: 64
End: 2024-11-04
Payer: COMMERCIAL

## 2024-11-04 ENCOUNTER — OFFICE VISIT (OUTPATIENT)
Dept: FAMILY MEDICINE CLINIC | Age: 64
End: 2024-11-04
Payer: COMMERCIAL

## 2024-11-04 VITALS
SYSTOLIC BLOOD PRESSURE: 131 MMHG | DIASTOLIC BLOOD PRESSURE: 75 MMHG | HEIGHT: 72 IN | HEART RATE: 82 BPM | WEIGHT: 234.8 LBS | BODY MASS INDEX: 31.8 KG/M2 | TEMPERATURE: 98.4 F

## 2024-11-04 DIAGNOSIS — I10 ESSENTIAL HYPERTENSION: Primary | ICD-10-CM

## 2024-11-04 DIAGNOSIS — E78.00 HIGH CHOLESTEROL: ICD-10-CM

## 2024-11-04 DIAGNOSIS — G56.03 CARPAL TUNNEL SYNDROME, BILATERAL: ICD-10-CM

## 2024-11-04 DIAGNOSIS — Z87.898 HISTORY OF SYNCOPE: ICD-10-CM

## 2024-11-04 DIAGNOSIS — R73.03 PREDIABETES: ICD-10-CM

## 2024-11-04 PROBLEM — I25.10 CORONARY ARTERY DISEASE INVOLVING NATIVE CORONARY ARTERY OF NATIVE HEART WITHOUT ANGINA PECTORIS: Status: RESOLVED | Noted: 2024-10-11 | Resolved: 2024-11-04

## 2024-11-04 LAB
ALBUMIN SERPL-MCNC: 4 G/DL (ref 3.5–5.2)
ALBUMIN/GLOB SERPL: 1.4 G/DL
ALP SERPL-CCNC: 80 U/L (ref 39–117)
ALT SERPL W P-5'-P-CCNC: 14 U/L (ref 1–41)
ANION GAP SERPL CALCULATED.3IONS-SCNC: 9.7 MMOL/L (ref 5–15)
AST SERPL-CCNC: 14 U/L (ref 1–40)
BILIRUB SERPL-MCNC: 0.3 MG/DL (ref 0–1.2)
BUN SERPL-MCNC: 21 MG/DL (ref 8–23)
BUN/CREAT SERPL: 17.8 (ref 7–25)
CALCIUM SPEC-SCNC: 9.5 MG/DL (ref 8.6–10.5)
CHLORIDE SERPL-SCNC: 104 MMOL/L (ref 98–107)
CHOLEST SERPL-MCNC: 137 MG/DL (ref 0–200)
CO2 SERPL-SCNC: 22.3 MMOL/L (ref 22–29)
CREAT SERPL-MCNC: 1.18 MG/DL (ref 0.76–1.27)
EGFRCR SERPLBLD CKD-EPI 2021: 68.9 ML/MIN/1.73
GLOBULIN UR ELPH-MCNC: 2.8 GM/DL
GLUCOSE SERPL-MCNC: 106 MG/DL (ref 65–99)
HBA1C MFR BLD: 5.9 % (ref 4.8–5.6)
HDLC SERPL-MCNC: 39 MG/DL (ref 40–60)
LDLC SERPL CALC-MCNC: 77 MG/DL (ref 0–100)
LDLC/HDLC SERPL: 1.91 {RATIO}
POTASSIUM SERPL-SCNC: 4.3 MMOL/L (ref 3.5–5.2)
PROT SERPL-MCNC: 6.8 G/DL (ref 6–8.5)
SODIUM SERPL-SCNC: 136 MMOL/L (ref 136–145)
TRIGL SERPL-MCNC: 117 MG/DL (ref 0–150)
VLDLC SERPL-MCNC: 21 MG/DL (ref 5–40)

## 2024-11-04 PROCEDURE — 99214 OFFICE O/P EST MOD 30 MIN: CPT | Performed by: FAMILY MEDICINE

## 2024-11-04 PROCEDURE — 80053 COMPREHEN METABOLIC PANEL: CPT | Performed by: FAMILY MEDICINE

## 2024-11-04 PROCEDURE — 80061 LIPID PANEL: CPT | Performed by: FAMILY MEDICINE

## 2024-11-04 PROCEDURE — 36415 COLL VENOUS BLD VENIPUNCTURE: CPT | Performed by: FAMILY MEDICINE

## 2024-11-04 PROCEDURE — 83036 HEMOGLOBIN GLYCOSYLATED A1C: CPT | Performed by: FAMILY MEDICINE

## 2024-11-04 NOTE — ASSESSMENT & PLAN NOTE
SYMPTOMS WERE POSSIBLY RELATED TO HIS C.A.D.  OBSERVE AS IMPROVED BUT STILL CONSIDER FURTHER WORKUP AT SOME POINT

## 2024-11-04 NOTE — ASSESSMENT & PLAN NOTE
EDUCATED AS TO THE DIAGNOSIS.  FOR NOW HE MAY USE OTC SPLINTS AND COLD PACKS.  CONSIDER HAND SURGERY EVAL

## 2024-11-04 NOTE — PROGRESS NOTES
Chief Complaint  Hypertension (Follow up)    Subjective          Marquise Murphy Jr. presents to [facility]  History of Present Illness  --TOLERATING BLOOD PRESSURE MEDICATION WITHOUT APPARENT SIDE EFFECTS  --LAST LIPIDS WERE OK, NO ISSUES WITH THE STATIN  --HGA1C WAS STABLE AT 6.3 % BUT HE HAS ALTERED HIS DIET SINCE HIS CARDIAC EVENT  --NO FURTHER EPISODES OF FEELING FAINT OR THUDDING HEARTBEAT SENSATION IN HE'S EAR SINCE HIS STENT PLACEMENT.    --NUMBNESS IN BOTH HANDS FROM TIME TO TIME, MOSTLY WHEN DRIVING, WAKES HIM UP AT NIGHT          No Known Allergies     Health Maintenance Due   Topic Date Due    TDAP/TD VACCINES (1 - Tdap) Never done    ZOSTER VACCINE (1 of 2) Never done    BMI FOLLOWUP  06/20/2024        Current Outpatient Medications on File Prior to Visit   Medication Sig    aspirin 81 MG EC tablet Take 1 tablet by mouth Daily.    atorvastatin (LIPITOR) 40 MG tablet Take 1 tablet by mouth Every Night.    dapagliflozin Propanediol (Farxiga) 10 MG tablet Take 10 mg by mouth Daily. Do NOT SUBSTITUTE FOR GENERIC    fluticasone (FLONASE) 50 MCG/ACT nasal spray 2 sprays into the nostril(s) as directed by provider Daily. Administer 2 sprays in each nostril for each dose.    losartan (Cozaar) 25 MG tablet Take 1 tablet by mouth Daily.    metoprolol succinate XL (TOPROL-XL) 25 MG 24 hr tablet Take 1 tablet by mouth Daily.    nitroglycerin (NITROSTAT) 0.4 MG SL tablet Place 1 tablet under the tongue Every 5 (Five) Minutes As Needed for Chest Pain (Systolic BP Greater Than 100). Take no more than 3 doses in 15 minutes.    ticagrelor (BRILINTA) 90 MG tablet tablet Take 1 tablet by mouth 2 (Two) Times a Day.     No current facility-administered medications on file prior to visit.       Immunization History   Administered Date(s) Administered    COVID-19 (MODERNA) 1st,2nd,3rd Dose Monovalent 07/30/2021, 08/27/2021       Review of Systems   Constitutional:  Negative for activity  "change, appetite change, chills, fatigue and fever.   HENT:  Negative for congestion, ear pain, rhinorrhea and sore throat.    Respiratory:  Negative for cough and shortness of breath.    Cardiovascular:  Negative for chest pain, palpitations and leg swelling.   Gastrointestinal:  Negative for abdominal pain, constipation, diarrhea, nausea and vomiting.   Musculoskeletal:  Negative for arthralgias and myalgias.   Neurological:  Negative for headache.        Objective     /75 (BP Location: Left arm, Patient Position: Sitting)   Pulse 82   Temp 98.4 °F (36.9 °C) (Oral)   Ht 182.9 cm (72\")   Wt 107 kg (234 lb 12.8 oz)   BMI 31.84 kg/m²       Physical Exam  Vitals and nursing note reviewed.   Constitutional:       General: He is not in acute distress.     Appearance: Normal appearance.   Cardiovascular:      Rate and Rhythm: Normal rate and regular rhythm.      Heart sounds: Normal heart sounds. No murmur heard.  Pulmonary:      Effort: Pulmonary effort is normal.      Breath sounds: Normal breath sounds.   Abdominal:      Palpations: Abdomen is soft.      Tenderness: There is no abdominal tenderness.   Musculoskeletal:      Cervical back: Neck supple.      Right lower leg: No edema.      Left lower leg: No edema.      Comments: POSITIVE TINEL'S SIGN BILATERALLY    Lymphadenopathy:      Cervical: No cervical adenopathy.   Neurological:      General: No focal deficit present.      Mental Status: He is alert.      Cranial Nerves: No cranial nerve deficit.      Coordination: Coordination normal.      Gait: Gait normal.   Psychiatric:         Mood and Affect: Mood normal.         Behavior: Behavior normal.         Result Review :                             Assessment and Plan      Diagnoses and all orders for this visit:    1. Essential hypertension (Primary)  Assessment & Plan:  Hypertension is stable and controlled  Continue current treatment regimen.  Blood pressure will be reassessed in 6 " months.    Orders:  -     Comprehensive Metabolic Panel    2. High cholesterol  Assessment & Plan:   Lipid abnormalities are stable    Plan:  Continue same medication/s without change.      Discussed medication dosage, use, side effects, and goals of treatment in detail.    Counseled patient on lifestyle modifications to help control hyperlipidemia.     Patient Treatment Goals:   LDL goal is less than 70    Followup in 6 months.    Orders:  -     Lipid Panel    3. Prediabetes  Assessment & Plan:  STABLE CURRENTLY, WILL RECHECK LABS AND PROCEED ACCORDINGLY     Orders:  -     Hemoglobin A1c    4. History of syncope  Assessment & Plan:  SYMPTOMS WERE POSSIBLY RELATED TO HIS C.A.D.  OBSERVE AS IMPROVED BUT STILL CONSIDER FURTHER WORKUP AT SOME POINT      5. Carpal tunnel syndrome, bilateral  Assessment & Plan:  EDUCATED AS TO THE DIAGNOSIS.  FOR NOW HE MAY USE OTC SPLINTS AND COLD PACKS.  CONSIDER HAND SURGERY EVAL            BMI is >= 30 and <35. (Class 1 Obesity). The following options were offered after discussion;: nutrition counseling/recommendations           Follow Up     Return in about 6 months (around 5/4/2025).    Patient was given instructions and counseling regarding his condition or for health maintenance advice. Please see specific information pulled into the AVS if appropriate.

## 2024-12-03 ENCOUNTER — OFFICE VISIT (OUTPATIENT)
Age: 64
End: 2024-12-03
Payer: COMMERCIAL

## 2024-12-03 VITALS
BODY MASS INDEX: 31.56 KG/M2 | DIASTOLIC BLOOD PRESSURE: 80 MMHG | HEART RATE: 67 BPM | SYSTOLIC BLOOD PRESSURE: 128 MMHG | WEIGHT: 233 LBS | OXYGEN SATURATION: 98 % | HEIGHT: 72 IN

## 2024-12-03 DIAGNOSIS — I25.10 CORONARY ARTERY DISEASE INVOLVING NATIVE CORONARY ARTERY OF NATIVE HEART WITHOUT ANGINA PECTORIS: ICD-10-CM

## 2024-12-03 DIAGNOSIS — I50.22 CHRONIC HFREF (HEART FAILURE WITH REDUCED EJECTION FRACTION): Primary | ICD-10-CM

## 2024-12-03 RX ORDER — LOSARTAN POTASSIUM 50 MG/1
50 TABLET ORAL DAILY
Qty: 90 TABLET | Refills: 3 | Status: SHIPPED | OUTPATIENT
Start: 2024-12-03

## 2024-12-03 RX ORDER — METOPROLOL SUCCINATE 50 MG/1
50 TABLET, EXTENDED RELEASE ORAL DAILY
Qty: 90 TABLET | Refills: 3 | Status: SHIPPED | OUTPATIENT
Start: 2024-12-03

## 2024-12-03 NOTE — PROGRESS NOTES
Subjective:     Encounter Date:12/3/2024      Patient ID: Marquise Murphy Jr. is a 64 y.o. male.    Chief Complaint:  Follow-up CAD, HFrEF    HPI:   64 y.o. male  with hypertension, hyperlipidemia, hearing loss, anterior STEMI in August 2024 status post PCI to proximal LAD, HFrEF (EF 36 to 40%) who presents for follow-up.  I last saw the patient in October and he was doing well at the time.  We initially tried to switch him from lisinopril to Entresto but this was discontinued due to cost.  He has instead been on losartan 25 mg daily.  He has been feeling well.  Has been walking a mile to 2 miles every morning on the treadmill and has no cardiac symptoms during this activity.  He denies any occasion side effects.    Per prior note:  Patient had an anterior STEMI in late August and received successful IVUS guided PCI with a 3.0 x 38mm Xience Skypoint drug-eluting stent (postdilated proximally with a 4.0 mm NC and distally with a 3.0 mm NC.) Left ventriculography revealed mid to distal anterior wall and apical hypokinesis.  Echocardiogram revealed an EF of 35 to 40% with hypokinesis of the mid anterior wall and akinesis of the apex.  He followed up with Mely Caceres in mid September and was started on spironolactone.  Unfortunately this led to hypotension with some associated dizziness so he stopped this.  He has been going to cardiac rehab and has been improving each time.  He currently denies any chest pain or dyspnea on exertion.       The following portions of the patient's history were reviewed and updated as appropriate: allergies, current medications, past family history, past medical history, past social history, past surgical history and problem list.     REVIEW OF SYSTEMS:   All systems reviewed.  Pertinent positives identified in HPI.  All other systems are negative.    Past Medical History:   Diagnosis Date    Asthma FOR YEARS    HARD TO BREATH THRU NOSE    HL (hearing loss) FOR YEARS IN RIGHT EAR     GOES IN AND OUT    Visual impairment        Family History   Problem Relation Age of Onset    Diabetes Mother     Heart disease Father        Social History     Socioeconomic History    Marital status: Single   Tobacco Use    Smoking status: Former     Current packs/day: 0.00     Average packs/day: 0.5 packs/day for 42.0 years (21.0 ttl pk-yrs)     Types: Cigarettes     Start date: 10/1/1981     Quit date: 10/2023     Years since quittin.1     Passive exposure: Past    Smokeless tobacco: Never   Vaping Use    Vaping status: Never Used   Substance and Sexual Activity    Alcohol use: Not Currently    Drug use: Never    Sexual activity: Not Currently     Partners: Female       No Known Allergies    Past Surgical History:   Procedure Laterality Date    CARDIAC CATHETERIZATION N/A 2024    Procedure: Left Heart Cath;  Surgeon: Dillon Collazo MD;  Location: [facility] CATH INVASIVE LOCATION;  Service: Cardiovascular;  Laterality: N/A;    CARDIAC CATHETERIZATION N/A 2024    Procedure: Coronary angiography;  Surgeon: Dillon Collazo MD;  Location: [facility] CATH INVASIVE LOCATION;  Service: Cardiovascular;  Laterality: N/A;    CARDIAC CATHETERIZATION N/A 2024    Procedure: Stent KATHY coronary;  Surgeon: Dillon Collazo MD;  Location: [facility] CATH INVASIVE LOCATION;  Service: Cardiovascular;  Laterality: N/A;    CARDIAC CATHETERIZATION N/A 2024    Procedure: Percutaneous Coronary Intervention;  Surgeon: Dillon Collazo MD;  [facility] C[facility] INVASIVE LOC[facility];  Service: Cardiovascular;  Laterality: N/A;    CARDIAC CATHETERIZATION N/A 2024    Procedure: Left ventriculography;  Surgeon: Dillon Collazo MD;  Location: [facility] CATH INVASIVE LOCATION;  Service: Cardiovascular;  Laterality: N/A;    COLONOSCOPY      POLYPS    EYE ENUCLEATION Right     FROM TRAUMA    FACIAL FRACTURE SURGERY      INTERVENTIONAL RADIOLOGY PROCEDURE N/A 2024    Procedure: Intravascular Ultrasound;  Surgeon: Dillon Collazo MD;  Location: [facility]  CATH INVASIVE LOCATION;  Service: Cardiovascular;  Laterality: N/A;    TONSILLECTOMY         Procedures       Objective:         Vitals:    12/03/24 0946   BP: 128/80   Pulse: 67   SpO2: 98%       PHYSICAL EXAM:  GEN: well appearing, in NAD   HEENT: NCAT, EOMI, moist mucus membranes   Respiratory: CTAB, no wheezes, rales or rhonchi  CV: normal rate, regular rhythm, normal S1, S2, no murmurs, rubs, gallops, +2 radial pulses b/l  GI: soft, nontender, nondistended  MSK: no edema  Skin: no rash, warm, dry  Heme/Lymph: no bruising or bleeding  Neuro: Alert and Oriented x 3, grossly normal motor function        Assessment:         (I50.22) Chronic HFrEF (heart failure with reduced ejection fraction) - Plan: Adult Transthoracic Echo Complete w/ Color, Spectral and Contrast if Necessary Per Protocol    (I25.10) Coronary artery disease (stent)    64 y.o. male  with hypertension, hyperlipidemia, hearing loss, anterior STEMI in August 2024 status post PCI to proximal LAD, HFrEF (EF 36 to 40%) who presents for follow-up.      Plan:       #HFrEF, stage C, NYHA I  EF 36 to 40%.  Currently asymptomatic.  Unable to tolerate spironolactone due to dizziness and hypotension.  - increase Toprol to 50 mg daily, losartan to 50 mg daily (Entresto was cost prohibitive)  - continue Farxiga 10 mg daily  - echo in 3 months    #CAD  As above, anterior STEMI in late August requiring PCI to the proximal LAD with a 3.0 x 38mm Xience Skypoint drug-eluting stent (postdilated proximally with a 4.0 mm NC and distally with a 3.0 mm NC.)  -Continue aspirin 81 mg daily, ticagrelor 90 mg twice daily, atorvastatin 40 mg daily    Kip Mena MD, thank you very much for referring this kind patient to me. Please call me with any questions or concerns. I will see the patient again in the office in 3 months or earlier as needed.         Dillon Cortez MD, [facility]  12/03/24  [facility]    Outpatient Encounter Medications as of 12/3/2024    Medication Sig Dispense Refill    aspirin 81 MG EC tablet Take 1 tablet by mouth Daily. 30 tablet 11    atorvastatin (LIPITOR) 40 MG tablet Take 1 tablet by mouth Every Night. 90 tablet 3    dapagliflozin Propanediol (Farxiga) 10 MG tablet Take 10 mg by mouth Daily. Do NOT SUBSTITUTE FOR GENERIC 30 tablet 11    fluticasone (FLONASE) 50 MCG/ACT nasal spray 2 sprays into the nostril(s) as directed by provider Daily. Administer 2 sprays in each nostril for each dose. 16 g 6    losartan (Cozaar) 50 MG tablet Take 1 tablet by mouth Daily. 90 tablet 3    metoprolol succinate XL (TOPROL-XL) 50 MG 24 hr tablet Take 1 tablet by mouth Daily. 90 tablet 3    nitroglycerin (NITROSTAT) 0.4 MG SL tablet Place 1 tablet under the tongue Every 5 (Five) Minutes As Needed for Chest Pain (Systolic BP Greater Than 100). Take no more than 3 doses in 15 minutes. 25 tablet 1    ticagrelor (BRILINTA) 90 MG tablet tablet Take 1 tablet by mouth 2 (Two) Times a Day. 60 tablet 11    [DISCONTINUED] losartan (Cozaar) 25 MG tablet Take 1 tablet by mouth Daily. 90 tablet 3    [DISCONTINUED] metoprolol succinate XL (TOPROL-XL) 25 MG 24 hr tablet Take 1 tablet by mouth Daily. 90 tablet 3     No facility-administered encounter medications on file as of 12/3/2024.

## 2025-03-03 ENCOUNTER — TELEPHONE (OUTPATIENT)
Dept: CARDIOLOGY | Facility: CLINIC | Age: 65
End: 2025-03-03
Payer: COMMERCIAL

## 2025-03-03 ENCOUNTER — OFFICE VISIT (OUTPATIENT)
Dept: CARDIOLOGY | Facility: CLINIC | Age: 65
End: 2025-03-03
Payer: COMMERCIAL

## 2025-03-03 ENCOUNTER — HOSPITAL ENCOUNTER (OUTPATIENT)
Dept: CARDIOLOGY | Facility: HOSPITAL | Age: 65
Discharge: HOME | End: 2025-03-03
Admitting: STUDENT IN AN ORGANIZED HEALTH CARE EDUCATION/TRAINING PROGRAM
Payer: COMMERCIAL

## 2025-03-03 VITALS
DIASTOLIC BLOOD PRESSURE: 80 MMHG | SYSTOLIC BLOOD PRESSURE: 130 MMHG | BODY MASS INDEX: 31.69 KG/M2 | WEIGHT: 234 LBS | HEART RATE: 56 BPM | HEIGHT: 72 IN | OXYGEN SATURATION: 95 %

## 2025-03-03 VITALS
HEIGHT: 72 IN | BODY MASS INDEX: 31.56 KG/M2 | WEIGHT: 233 LBS | SYSTOLIC BLOOD PRESSURE: 130 MMHG | OXYGEN SATURATION: 96 % | DIASTOLIC BLOOD PRESSURE: 82 MMHG | HEART RATE: 56 BPM

## 2025-03-03 DIAGNOSIS — I50.22 CHRONIC HFREF (HEART FAILURE WITH REDUCED EJECTION FRACTION): Primary | ICD-10-CM

## 2025-03-03 DIAGNOSIS — I50.22 CHRONIC HFREF (HEART FAILURE WITH REDUCED EJECTION FRACTION): ICD-10-CM

## 2025-03-03 LAB
AORTIC ARCH: 2.6 CM
ASCENDING AORTA: 3.7 CM
AV MEAN PRESS GRAD SYS DOP V1V2: 3 MMHG
AV VMAX SYS DOP: 112 CM/SEC
BH CV ECHO LEFT VENTRICLE GLOBAL LONGITUDINAL STRAIN: -15.7 %
BH CV ECHO MEAS - ACS: 2.6 CM
BH CV ECHO MEAS - AO MAX PG: 5 MMHG
BH CV ECHO MEAS - AO ROOT DIAM: 4.1 CM
BH CV ECHO MEAS - AO V2 VTI: 24.3 CM
BH CV ECHO MEAS - AVA(I,D): 2.02 CM2
BH CV ECHO MEAS - EDV(CUBED): 190.6 ML
BH CV ECHO MEAS - EDV(MOD-SP2): 148 ML
BH CV ECHO MEAS - EDV(MOD-SP4): 147 ML
BH CV ECHO MEAS - EF(MOD-SP2): 50 %
BH CV ECHO MEAS - EF(MOD-SP4): 46.3 %
BH CV ECHO MEAS - ESV(CUBED): 81.7 ML
BH CV ECHO MEAS - ESV(MOD-SP2): 74 ML
BH CV ECHO MEAS - ESV(MOD-SP4): 79 ML
BH CV ECHO MEAS - FS: 24.6 %
BH CV ECHO MEAS - IVS/LVPW: 0.97 CM
BH CV ECHO MEAS - IVSD: 0.99 CM
BH CV ECHO MEAS - LAT PEAK E' VEL: 8.5 CM/SEC
BH CV ECHO MEAS - LV DIASTOLIC VOL/BSA (35-75): 64.7 CM2
BH CV ECHO MEAS - LV MASS(C)D: 230.8 GRAMS
BH CV ECHO MEAS - LV MAX PG: 1.38 MMHG
BH CV ECHO MEAS - LV MEAN PG: 1 MMHG
BH CV ECHO MEAS - LV SYSTOLIC VOL/BSA (12-30): 34.8 CM2
BH CV ECHO MEAS - LV V1 MAX: 58.7 CM/SEC
BH CV ECHO MEAS - LV V1 VTI: 13.1 CM
BH CV ECHO MEAS - LVIDD: 5.8 CM
BH CV ECHO MEAS - LVIDS: 4.3 CM
BH CV ECHO MEAS - LVOT AREA: 3.8 CM2
BH CV ECHO MEAS - LVOT DIAM: 2.19 CM
BH CV ECHO MEAS - LVPWD: 1.02 CM
BH CV ECHO MEAS - MED PEAK E' VEL: 6.9 CM/SEC
BH CV ECHO MEAS - MR MAX PG: 22.1 MMHG
BH CV ECHO MEAS - MR MAX VEL: 235.3 CM/SEC
BH CV ECHO MEAS - MV A DUR: 0.11 SEC
BH CV ECHO MEAS - MV A MAX VEL: 72.4 CM/SEC
BH CV ECHO MEAS - MV DEC SLOPE: 192 CM/SEC2
BH CV ECHO MEAS - MV DEC TIME: 0.22 SEC
BH CV ECHO MEAS - MV E MAX VEL: 57 CM/SEC
BH CV ECHO MEAS - MV E/A: 0.79
BH CV ECHO MEAS - MV MAX PG: 2.6 MMHG
BH CV ECHO MEAS - MV MEAN PG: 1 MMHG
BH CV ECHO MEAS - MV P1/2T: 101.2 MSEC
BH CV ECHO MEAS - MV V2 VTI: 24.4 CM
BH CV ECHO MEAS - MVA(P1/2T): 2.17 CM2
BH CV ECHO MEAS - MVA(VTI): 2.01 CM2
BH CV ECHO MEAS - PA ACC TIME: 0.12 SEC
BH CV ECHO MEAS - PA V2 MAX: 88.2 CM/SEC
BH CV ECHO MEAS - PULM A REVS DUR: 0.11 SEC
BH CV ECHO MEAS - PULM A REVS VEL: 26.2 CM/SEC
BH CV ECHO MEAS - PULM DIAS VEL: 40.1 CM/SEC
BH CV ECHO MEAS - PULM S/D: 1.46
BH CV ECHO MEAS - PULM SYS VEL: 58.6 CM/SEC
BH CV ECHO MEAS - QP/QS: 0.93
BH CV ECHO MEAS - RAP SYSTOLE: 3 MMHG
BH CV ECHO MEAS - RV MAX PG: 1.59 MMHG
BH CV ECHO MEAS - RV V1 MAX: 63 CM/SEC
BH CV ECHO MEAS - RV V1 VTI: 14.3 CM
BH CV ECHO MEAS - RVOT DIAM: 2.02 CM
BH CV ECHO MEAS - SUP REN AO DIAM: 2.4 CM
BH CV ECHO MEAS - SV(LVOT): 49.1 ML
BH CV ECHO MEAS - SV(MOD-SP2): 74 ML
BH CV ECHO MEAS - SV(MOD-SP4): 68 ML
BH CV ECHO MEAS - SV(RVOT): 45.7 ML
BH CV ECHO MEAS - SVI(LVOT): 21.6 ML/M2
BH CV ECHO MEAS - SVI(MOD-SP2): 32.6 ML/M2
BH CV ECHO MEAS - SVI(MOD-SP4): 29.9 ML/M2
BH CV ECHO MEAS - TAPSE (>1.6): 2.08 CM
BH CV ECHO MEASUREMENTS AVERAGE E/E' RATIO: 7.4
BH CV XLRA - RV BASE: 2.8 CM
BH CV XLRA - RV LENGTH: 8.1 CM
BH CV XLRA - RV MID: 2.14 CM
BH CV XLRA - TDI S': 16.5 CM/SEC
LEFT ATRIUM VOLUME INDEX: 29.1 ML/M2
LV EF BIPLANE MOD: 48.6 %
SINUS: 3.7 CM
STJ: 3.3 CM

## 2025-03-03 PROCEDURE — 93306 TTE W/DOPPLER COMPLETE: CPT | Performed by: INTERNAL MEDICINE

## 2025-03-03 PROCEDURE — 93306 TTE W/DOPPLER COMPLETE: CPT

## 2025-03-03 PROCEDURE — 93356 MYOCRD STRAIN IMG SPCKL TRCK: CPT | Performed by: INTERNAL MEDICINE

## 2025-03-03 PROCEDURE — 25510000001 PERFLUTREN 6.52 MG/ML SUSPENSION 2 ML VIAL: Performed by: STUDENT IN AN ORGANIZED HEALTH CARE EDUCATION/TRAINING PROGRAM

## 2025-03-03 PROCEDURE — 99214 OFFICE O/P EST MOD 30 MIN: CPT | Performed by: NURSE PRACTITIONER

## 2025-03-03 PROCEDURE — 93356 MYOCRD STRAIN IMG SPCKL TRCK: CPT

## 2025-03-03 PROCEDURE — 93000 ELECTROCARDIOGRAM COMPLETE: CPT | Performed by: NURSE PRACTITIONER

## 2025-03-03 RX ORDER — CLOPIDOGREL BISULFATE 75 MG/1
75 TABLET ORAL DAILY
Qty: 90 TABLET | Refills: 3 | Status: SHIPPED | OUTPATIENT
Start: 2025-03-03

## 2025-03-03 RX ORDER — SPIRONOLACTONE 25 MG/1
12.5 TABLET ORAL DAILY
Qty: 30 TABLET | Refills: 11 | Status: SHIPPED | OUTPATIENT
Start: 2025-03-03

## 2025-03-03 RX ADMIN — PERFLUTREN 3.5 ML: 6.52 INJECTION, SUSPENSION INTRAVENOUS at 10:41

## 2025-03-03 NOTE — TELEPHONE ENCOUNTER
----- Message from Mely Caceres sent at 3/3/2025  1:06 PM EST -----  Let patient know that I talked to Dr. Cortez.  He wants to switch his Brilinta to Plavix since he is on Eliquis.  I tried calling him and he did not answer.  That I sent a prescription in for Plavix 75 mg once a day.  Please reiterate to him that Brilinta is twice a day but Plavix is only going to be once a day.  So he will be on Plavix 75 mg daily and Eliquis 5 mg twice a day.

## 2025-03-03 NOTE — PROGRESS NOTES
Date of Office Visit: 2025  Encounter Provider: ESTHELA Alejo  Place of Service: [facility] CARDIOLOGY  Patient Name: Marquise Murphy Jr.  :1960    Chief complaint: Coronary artery disease    HPI: Marquise Murphy Jr. is a 64 y.o. male who is a patient of Dr. Cortez and is known to me from previous.  He has a history of hypertension, hyperlipidemia and hearing loss.  He also is an ex-smoker.  In August of last year he presented with an anterior ST elevation MI.  Coronary anatomy revealed 100% occlusion of the proximal LAD he underwent a drug-eluting stent with a 3 x 38 mm Xience jonathon point drug-eluting stent.  Postprocedure he was put on dual antiplatelet therapy with aspirin and Brilinta we started him on medical therapy with spironolactone, metoprolol and Entresto.  However his blood pressure got low he was the spironolactone was stopped and Entresto was too expensive and we switched over to losartan.  His EF was 35 to 40%.  I saw him for follow-up last year he was started on Farxiga 10 mg daily.    He came in today for follow-up he had his echocardiogram his EF appears to be around 45 to 50%.  He has a apical thrombus.  He has no symptoms.  Denies any chest pain, pressure or tightness.  No lightheadedness or dizziness.  He works out for an hour every morning without difficulty.  He has noted some fatigue in the late afternoon hours and has to take it.  He is compliant with dual antiplatelet therapy with aspirin and Brilinta.  Previous testing and notes have been reviewed by me.   Past Medical History:   Diagnosis Date    Asthma FOR YEARS    HARD TO BREATH THRU NOSE    HL (hearing loss) FOR YEARS IN RIGHT EAR    GOES IN AND OUT    Visual impairment        Past Surgical History:   Procedure Laterality Date    CARDIAC CATHETERIZATION N/A 2024    Procedure: Left Heart Cath;  Surgeon: Dillon Collazo MD;  Location: [facility] CATH INVASIVE LOCATION;  Service:  Cardiovascular;  Laterality: N/A;    CARDIAC CATHETERIZATION N/A 2024    Procedure: Coronary angiography;  Surgeon: Dillon Collazo MD;  Location:  [facility] CATH INVASIVE LOCATION;  Service: Cardiovascular;  Laterality: N/A;    CARDIAC CATHETERIZATION N/A 2024    Procedure: Stent KATHY coronary;  Surgeon: Dillon Collazo MD;  Location:  [facility] CATH INVASIVE LOCATION;  Service: Cardiovascular;  Laterality: N/A;    CARDIAC CATHETERIZATION N/A 2024    Procedure: Percutaneous Coronary Intervention;  Surgeon: Dillon Collazo MD;  Location:  [facility] CATH INVASIVE LOCATION;  Service: Cardiovascular;  Laterality: N/A;    CARDIAC CATHETERIZATION N/A 2024    Procedure: Left ventriculography;  Surgeon: Dillon Collazo MD;  Location:  [facility] CATH INVASIVE LOCATION;  Service: Cardiovascular;  Laterality: N/A;    COLONOSCOPY      POLYPS    EYE ENUCLEATION Right     FROM TRAUMA    FACIAL FRACTURE SURGERY      INTERVENTIONAL RADIOLOGY PROCEDURE N/A 2024    Procedure: Intravascular Ultrasound;  Surgeon: Dillon Collazo MD;  Location:  [facility] CATH INVASIVE LOCATION;  Service: Cardiovascular;  Laterality: N/A;    TONSILLECTOMY         Social History     Socioeconomic History    Marital status: Single   Tobacco Use    Smoking status: Former     Current packs/day: 0.00     Average packs/day: 0.5 packs/day for 42.0 years (21.0 ttl pk-yrs)     Types: Cigarettes     Start date: 10/1/1981     Quit date: 10/2023     Years since quittin.4     Passive exposure: Past    Smokeless tobacco: Never   Vaping Use    Vaping status: Never Used   Substance and Sexual Activity    Alcohol use: Not Currently    Drug use: Never    Sexual activity: Not Currently     Partners: Female       Family History   Problem Relation Age of Onset    Diabetes Mother     Heart disease Father        Review of Systems   Constitutional: Positive for malaise/fatigue. Negative for diaphoresis.   Cardiovascular:  Negative for chest pain, claudication, dyspnea on exertion,  "irregular heartbeat, leg swelling, near-syncope, orthopnea, palpitations, paroxysmal nocturnal dyspnea and syncope.   Respiratory:  Negative for cough, shortness of breath and sleep disturbances due to breathing.    Musculoskeletal:  Negative for falls.   Neurological:  Negative for dizziness and weakness.   Psychiatric/Behavioral:  Negative for altered mental status and substance abuse.        No Known Allergies      Current Outpatient Medications:     atorvastatin (LIPITOR) 40 MG tablet, Take 1 tablet by mouth Every Night., Disp: 90 tablet, Rfl: 3    dapagliflozin Propanediol (Farxiga) 10 MG tablet, Take 10 mg by mouth Daily. Do NOT SUBSTITUTE FOR GENERIC, Disp: 30 tablet, Rfl: 11    fluticasone (FLONASE) 50 MCG/ACT nasal spray, 2 sprays into the nostril(s) as directed by provider Daily. Administer 2 sprays in each nostril for each dose., Disp: 16 g, Rfl: 6    losartan (Cozaar) 50 MG tablet, Take 1 tablet by mouth Daily., Disp: 90 tablet, Rfl: 3    metoprolol succinate XL (TOPROL-XL) 50 MG 24 hr tablet, Take 1 tablet by mouth Daily., Disp: 90 tablet, Rfl: 3    nitroglycerin (NITROSTAT) 0.4 MG SL tablet, Place 1 tablet under the tongue Every 5 (Five) Minutes As Needed for Chest Pain (Systolic BP Greater Than 100). Take no more than 3 doses in 15 minutes., Disp: 25 tablet, Rfl: 1    ticagrelor (BRILINTA) 90 MG tablet tablet, Take 1 tablet by mouth 2 (Two) Times a Day., Disp: 60 tablet, Rfl: 11    apixaban (ELIQUIS) 5 MG tablet tablet, Take 1 tablet by mouth 2 (Two) Times a Day., Disp: 60 tablet, Rfl: 11    spironolactone (ALDACTONE) 25 MG tablet, Take 0.5 tablets by mouth Daily., Disp: 30 tablet, Rfl: 11  No current facility-administered medications for this visit.        Objective:     Vitals:    03/03/25 1104   BP: 130/80   BP Location: Left arm   Patient Position: Sitting   Pulse: 56   SpO2: 95%   Weight: 106 kg (234 lb)   Height: 182.9 cm (72\")     Body mass index is 31.74 kg/m².    PHYSICAL " EXAM:    Constitutional:       General: Not in acute distress.     Appearance: Normal appearance. Well-developed.   Eyes:      Pupils: Pupils are equal, round, and reactive to light.   HENT:      Head: Normocephalic.   Neck:      Vascular: No carotid bruit or JVD.   Pulmonary:      Effort: Pulmonary effort is normal. No tachypnea.      Breath sounds: Normal breath sounds. No wheezing. No rales.   Cardiovascular:      Normal rate. Regular rhythm.      No gallop.    Pulses:     Intact distal pulses.   Edema:     Peripheral edema absent.   Abdominal:      General: Bowel sounds are normal.      Palpations: Abdomen is soft.      Tenderness: There is no abdominal tenderness.   Musculoskeletal: Normal range of motion.      Cervical back: Normal range of motion and neck supple. No edema. Skin:     General: Skin is warm and dry.   Neurological:      Mental Status: Alert and oriented to person, place, and time.           ECG 12 Lead    Date/Time: 3/3/2025 11:56 AM  Performed by: Mely Caceres APRN    Authorized by: Mely Caceres APRN  Comparison: compared with previous ECG from 9/11/2024  Similar to previous ECG  Rhythm: sinus rhythm  Rate: normal  Q waves: V2, V1 and V3    QRS axis: normal    Clinical impression: non-specific ECG        Lipid Panel          5/10/2024    08:41 8/30/2024    04:11 11/4/2024    10:16   Lipid Panel   Total Cholesterol 147  163  137    Triglycerides 92  155  117    HDL Cholesterol 41  37  39    VLDL Cholesterol 17  27  21    LDL Cholesterol  89  99  77    LDL/HDL Ratio 2.14  2.57  1.91          Assessment/Plan:      1.  Coronary artery disease status post drug-eluting stent to the LAD-will stop aspirin and lieu of apical thrombus and needing anticoagulation.  Continue Brilinta 90 mg twice a day.  Continue with statin.  No angina symptoms.    2.  Ischemic cardiomyopathy.  EF slowly improving.  Euvolemic on exam.  Continue guideline directed medical therapy with Farxiga 10 mg daily,  metoprolol succinate 50 mg daily and losartan 50 mg daily.  Will try to add back spironolactone 12.5 mg daily get labs 1 week later    3.  Apical thrombus.  Will stop aspirin start Eliquis 5 mg twice a day    4.  Dyslipidemia LDL goal of 55 continue atorvastatin 40 mg daily.    5.  Ex-smoker    Follow-up in 3 months with Dr. Cortez     Your medication list            Accurate as of March 3, 2025 11:43 AM. If you have any questions, ask your nurse or doctor.                START taking these medications        Instructions Last Dose Given Next Dose Due   apixaban 5 MG tablet tablet  Commonly known as: ELIQUIS  Started by: Mely Caceres      Take 1 tablet by mouth 2 (Two) Times a Day.       spironolactone 25 MG tablet  Commonly known as: ALDACTONE  Started by: Mely Caceres      Take 0.5 tablets by mouth Daily.              CONTINUE taking these medications        Instructions Last Dose Given Next Dose Due   atorvastatin 40 MG tablet  Commonly known as: LIPITOR      Take 1 tablet by mouth Every Night.       dapagliflozin Propanediol 10 MG tablet  Commonly known as: Farxiga      Take 10 mg by mouth Daily. Do NOT SUBSTITUTE FOR GENERIC       fluticasone 50 MCG/ACT nasal spray  Commonly known as: FLONASE      2 sprays into the nostril(s) as directed by provider Daily. Administer 2 sprays in each nostril for each dose.       losartan 50 MG tablet  Commonly known as: Cozaar      Take 1 tablet by mouth Daily.       metoprolol succinate XL 50 MG 24 hr tablet  Commonly known as: TOPROL-XL      Take 1 tablet by mouth Daily.       nitroglycerin 0.4 MG SL tablet  Commonly known as: NITROSTAT      Place 1 tablet under the tongue Every 5 (Five) Minutes As Needed for Chest Pain (Systolic BP Greater Than 100). Take no more than 3 doses in 15 minutes.       ticagrelor 90 MG tablet tablet  Commonly known as: BRILINTA      Take 1 tablet by mouth 2 (Two) Times a Day.              STOP taking these medications      Aspirin Low Dose 81 MG EC  tablet  Generic drug: aspirin  Stopped by: Mely Caceres                  Where to Get Your Medications        These medications were sent to [facility] [id number] - [place], [place] - [address] - [phone]  - [phone]  [address], [place] [address]      Phone: [phone]   apixaban 5 MG tablet tablet  spironolactone 25 MG tablet           As always, it has been a pleasure to participate in your patient's care.      Sincerely,     Mely PROCTOR

## 2025-03-04 NOTE — TELEPHONE ENCOUNTER
Pt called back in to triage.  Reviewed recommendations with patient, verbalized understanding, will call with any further questions or complaints.  Pt states that he will stop taking brilinta, start taking plavix 75mg daily and continue taking eliquis 5mg twice daily as recommended.    Kalie Flores RN  Triage Nurse  03/04/25 09:59 EST

## 2025-03-04 NOTE — TELEPHONE ENCOUNTER
Left voicemail for Marquise Murphy Jr. requesting callback.    HUB: please transfer to Triage if patient returns call    Thank you,  Giovanna LESLIE RN  Triage Nurse [facility] [place]  03/04/25   09:21 EST

## 2025-03-10 ENCOUNTER — LAB (OUTPATIENT)
Dept: LAB | Facility: HOSPITAL | Age: 65
End: 2025-03-10
Payer: COMMERCIAL

## 2025-03-10 DIAGNOSIS — I50.22 CHRONIC HFREF (HEART FAILURE WITH REDUCED EJECTION FRACTION): ICD-10-CM

## 2025-03-10 DIAGNOSIS — I21.02 STEMI INVOLVING LEFT ANTERIOR DESCENDING CORONARY ARTERY: ICD-10-CM

## 2025-03-10 LAB
ANION GAP SERPL CALCULATED.3IONS-SCNC: 12.9 MMOL/L (ref 5–15)
BUN SERPL-MCNC: 25 MG/DL (ref 8–23)
BUN/CREAT SERPL: 21.6 (ref 7–25)
CALCIUM SPEC-SCNC: 9.3 MG/DL (ref 8.6–10.5)
CHLORIDE SERPL-SCNC: 101 MMOL/L (ref 98–107)
CO2 SERPL-SCNC: 23.1 MMOL/L (ref 22–29)
CREAT SERPL-MCNC: 1.16 MG/DL (ref 0.76–1.27)
EGFRCR SERPLBLD CKD-EPI 2021: 70.3 ML/MIN/1.73
GLUCOSE SERPL-MCNC: 110 MG/DL (ref 65–99)
POTASSIUM SERPL-SCNC: 4.8 MMOL/L (ref 3.5–5.2)
SODIUM SERPL-SCNC: 137 MMOL/L (ref 136–145)

## 2025-03-10 PROCEDURE — 80048 BASIC METABOLIC PNL TOTAL CA: CPT

## 2025-03-10 PROCEDURE — 36415 COLL VENOUS BLD VENIPUNCTURE: CPT

## 2025-05-12 ENCOUNTER — OFFICE VISIT (OUTPATIENT)
Dept: FAMILY MEDICINE CLINIC | Age: 65
End: 2025-05-12
Payer: COMMERCIAL

## 2025-05-12 ENCOUNTER — LAB (OUTPATIENT)
Dept: LAB | Facility: HOSPITAL | Age: 65
End: 2025-05-12
Payer: COMMERCIAL

## 2025-05-12 VITALS
WEIGHT: 254.4 LBS | HEIGHT: 72 IN | TEMPERATURE: 98.1 F | HEART RATE: 77 BPM | BODY MASS INDEX: 34.46 KG/M2 | SYSTOLIC BLOOD PRESSURE: 152 MMHG | OXYGEN SATURATION: 98 % | DIASTOLIC BLOOD PRESSURE: 90 MMHG

## 2025-05-12 DIAGNOSIS — Z86.39 HISTORY OF IRON DEFICIENCY: ICD-10-CM

## 2025-05-12 DIAGNOSIS — E78.00 HIGH CHOLESTEROL: ICD-10-CM

## 2025-05-12 DIAGNOSIS — I10 ESSENTIAL HYPERTENSION: Primary | ICD-10-CM

## 2025-05-12 DIAGNOSIS — Z12.5 SCREENING FOR PROSTATE CANCER: ICD-10-CM

## 2025-05-12 DIAGNOSIS — R73.03 PREDIABETES: ICD-10-CM

## 2025-05-12 PROBLEM — Z87.898 HISTORY OF SYNCOPE: Status: RESOLVED | Noted: 2024-03-08 | Resolved: 2025-05-12

## 2025-05-12 LAB
ALBUMIN SERPL-MCNC: 4.3 G/DL (ref 3.5–5.2)
ALBUMIN/GLOB SERPL: 1.7 G/DL
ALP SERPL-CCNC: 72 U/L (ref 39–117)
ALT SERPL W P-5'-P-CCNC: 16 U/L (ref 1–41)
ANION GAP SERPL CALCULATED.3IONS-SCNC: 11.3 MMOL/L (ref 5–15)
AST SERPL-CCNC: 18 U/L (ref 1–40)
BILIRUB SERPL-MCNC: 0.4 MG/DL (ref 0–1.2)
BUN SERPL-MCNC: 17 MG/DL (ref 8–23)
BUN/CREAT SERPL: 15.3 (ref 7–25)
CALCIUM SPEC-SCNC: 9.3 MG/DL (ref 8.6–10.5)
CHLORIDE SERPL-SCNC: 100 MMOL/L (ref 98–107)
CHOLEST SERPL-MCNC: 170 MG/DL (ref 0–200)
CO2 SERPL-SCNC: 24.7 MMOL/L (ref 22–29)
CREAT SERPL-MCNC: 1.11 MG/DL (ref 0.76–1.27)
DEPRECATED RDW RBC AUTO: 49.5 FL (ref 37–54)
EGFRCR SERPLBLD CKD-EPI 2021: 74.2 ML/MIN/1.73
ERYTHROCYTE [DISTWIDTH] IN BLOOD BY AUTOMATED COUNT: 13.1 % (ref 12.3–15.4)
GLOBULIN UR ELPH-MCNC: 2.6 GM/DL
GLUCOSE SERPL-MCNC: 155 MG/DL (ref 65–99)
HBA1C MFR BLD: 6.6 % (ref 4.8–5.6)
HCT VFR BLD AUTO: 47.2 % (ref 37.5–51)
HDLC SERPL-MCNC: 47 MG/DL (ref 40–60)
HGB BLD-MCNC: 15.6 G/DL (ref 13–17.7)
IRON 24H UR-MRATE: 101 MCG/DL (ref 59–158)
IRON SATN MFR SERPL: 27 % (ref 20–50)
LDLC SERPL CALC-MCNC: 91 MG/DL (ref 0–100)
LDLC/HDLC SERPL: 1.81 {RATIO}
MCH RBC QN AUTO: 33 PG (ref 26.6–33)
MCHC RBC AUTO-ENTMCNC: 33.1 G/DL (ref 31.5–35.7)
MCV RBC AUTO: 99.8 FL (ref 79–97)
PLATELET # BLD AUTO: 196 10*3/MM3 (ref 140–450)
PMV BLD AUTO: 10.4 FL (ref 6–12)
POTASSIUM SERPL-SCNC: 4.7 MMOL/L (ref 3.5–5.2)
PROT SERPL-MCNC: 6.9 G/DL (ref 6–8.5)
PSA SERPL-MCNC: 3.71 NG/ML (ref 0–4)
RBC # BLD AUTO: 4.73 10*6/MM3 (ref 4.14–5.8)
SODIUM SERPL-SCNC: 136 MMOL/L (ref 136–145)
TIBC SERPL-MCNC: 378 MCG/DL (ref 298–536)
TRANSFERRIN SERPL-MCNC: 254 MG/DL (ref 200–360)
TRIGL SERPL-MCNC: 189 MG/DL (ref 0–150)
TSH SERPL DL<=0.05 MIU/L-ACNC: 1.45 UIU/ML (ref 0.27–4.2)
VLDLC SERPL-MCNC: 32 MG/DL (ref 5–40)
WBC NRBC COR # BLD AUTO: 7.84 10*3/MM3 (ref 3.4–10.8)

## 2025-05-12 PROCEDURE — 99214 OFFICE O/P EST MOD 30 MIN: CPT | Performed by: FAMILY MEDICINE

## 2025-05-12 PROCEDURE — 80050 GENERAL HEALTH PANEL: CPT | Performed by: FAMILY MEDICINE

## 2025-05-12 PROCEDURE — 83540 ASSAY OF IRON: CPT | Performed by: FAMILY MEDICINE

## 2025-05-12 PROCEDURE — G0103 PSA SCREENING: HCPCS | Performed by: FAMILY MEDICINE

## 2025-05-12 PROCEDURE — 83036 HEMOGLOBIN GLYCOSYLATED A1C: CPT | Performed by: FAMILY MEDICINE

## 2025-05-12 PROCEDURE — 36415 COLL VENOUS BLD VENIPUNCTURE: CPT | Performed by: FAMILY MEDICINE

## 2025-05-12 PROCEDURE — 84466 ASSAY OF TRANSFERRIN: CPT | Performed by: FAMILY MEDICINE

## 2025-05-12 PROCEDURE — 80061 LIPID PANEL: CPT | Performed by: FAMILY MEDICINE

## 2025-05-12 NOTE — PROGRESS NOTES
Chief Complaint  Hypertension (6 months)    Subjective          Marquise MARTINEZ Jeffrey Echavarria presents to [facility]  History of Present Illness  --TOLERATING BLOOD PRESSURE MEDICATION WITHOUT APPARENT SIDE EFFECTS  --LAST LIPIDS WERE OK, NO ISSUES WITH THE STATIN  --LAST HGA1C WAS 6.3 % WITH DIETARY MEASURES ALONE        No Known Allergies     Health Maintenance Due   Topic Date Due    Pneumococcal Vaccine 50+ (1 of 2 - PCV) Never done    TDAP/TD VACCINES (1 - Tdap) Never done    ZOSTER VACCINE (1 of 2) Never done    COVID-19 Vaccine (3 - 2024-25 season) 09/01/2024    LUNG CANCER SCREENING  06/05/2025        Current Outpatient Medications on File Prior to Visit   Medication Sig    apixaban (ELIQUIS) 5 MG tablet tablet Take 1 tablet by mouth 2 (Two) Times a Day.    atorvastatin (LIPITOR) 40 MG tablet Take 1 tablet by mouth Every Night.    clopidogrel (PLAVIX) 75 MG tablet Take 1 tablet by mouth Daily.    dapagliflozin Propanediol (Farxiga) 10 MG tablet Take 10 mg by mouth Daily. Do NOT SUBSTITUTE FOR GENERIC    fluticasone (FLONASE) 50 MCG/ACT nasal spray 2 sprays into the nostril(s) as directed by provider Daily. Administer 2 sprays in each nostril for each dose.    losartan (Cozaar) 50 MG tablet Take 1 tablet by mouth Daily.    metoprolol succinate XL (TOPROL-XL) 50 MG 24 hr tablet Take 1 tablet by mouth Daily.    nitroglycerin (NITROSTAT) 0.4 MG SL tablet Place 1 tablet under the tongue Every 5 (Five) Minutes As Needed for Chest Pain (Systolic BP Greater Than 100). Take no more than 3 doses in 15 minutes.    spironolactone (ALDACTONE) 25 MG tablet Take 0.5 tablets by mouth Daily.     No current facility-administered medications on file prior to visit.       Immunization History   Administered Date(s) Administered    COVID-19 (MODERNA) 1st,2nd,3rd Dose Monovalent 07/30/2021, 08/27/2021       Review of Systems   Constitutional:  Negative for activity change, appetite change, chills,  "fatigue and fever.   HENT:  Negative for congestion, ear pain, rhinorrhea and sore throat.    Respiratory:  Negative for cough and shortness of breath.    Cardiovascular:  Negative for chest pain, palpitations and leg swelling.   Gastrointestinal:  Negative for abdominal pain, constipation, diarrhea, nausea and vomiting.   Musculoskeletal:  Negative for arthralgias and myalgias.   Neurological:  Negative for headache.        Objective     /90 (BP Location: Left arm, Patient Position: Sitting)   Pulse 77   Temp 98.1 °F (36.7 °C) (Oral)   Ht 182.9 cm (72\")   Wt 115 kg (254 lb 6.4 oz)   SpO2 98% Comment: on RA  BMI 34.50 kg/m²       Physical Exam  Vitals and nursing note reviewed.   Constitutional:       General: He is not in acute distress.     Appearance: Normal appearance.   Cardiovascular:      Rate and Rhythm: Normal rate and regular rhythm.      Heart sounds: Normal heart sounds. No murmur heard.  Pulmonary:      Effort: Pulmonary effort is normal.      Breath sounds: Normal breath sounds.   Abdominal:      Palpations: Abdomen is soft.      Tenderness: There is no abdominal tenderness.   Musculoskeletal:      Cervical back: Neck supple.      Right lower leg: No edema.      Left lower leg: No edema.   Lymphadenopathy:      Cervical: No cervical adenopathy.   Neurological:      General: No focal deficit present.      Mental Status: He is alert.      Cranial Nerves: No cranial nerve deficit.      Coordination: Coordination normal.      Gait: Gait normal.   Psychiatric:         Mood and Affect: Mood normal.         Behavior: Behavior normal.         Result Review :                             Assessment and Plan      Diagnoses and all orders for this visit:    1. Essential hypertension (Primary)  Assessment & Plan:  Hypertension is stable and controlled  Continue current treatment regimen.  Blood pressure will be reassessed in 6 months.    Orders:  -     CBC (No Diff)  -     TSH Rfx On Abnormal To Free " T4    2. High cholesterol  Assessment & Plan:   Lipid abnormalities are stable    Plan:  Continue same medication/s without change.      Discussed medication dosage, use, side effects, and goals of treatment in detail.    Counseled patient on lifestyle modifications to help control hyperlipidemia.     Patient Treatment Goals:   LDL goal is less than 70    Followup in 6 months.    Orders:  -     Comprehensive Metabolic Panel  -     Lipid Panel    3. History of iron deficiency  -     Iron Profile    4. Prediabetes  Assessment & Plan:  NOT QUITE AT GOAL, WILL REPEAT LABS AND PROCEED ACCORDINGLY     Orders:  -     Hemoglobin A1c    5. Screening for prostate cancer  -     PSA Screen                    Follow Up     Return in about 6 months (around 11/12/2025).    Patient was given instructions and counseling regarding his condition or for health maintenance advice. Please see specific information pulled into the AVS if appropriate.

## 2025-05-13 ENCOUNTER — RESULTS FOLLOW-UP (OUTPATIENT)
Dept: FAMILY MEDICINE CLINIC | Age: 65
End: 2025-05-13
Payer: COMMERCIAL

## 2025-05-13 NOTE — LETTER
Marquise Villanuevaadeelnamhazel JrJose Juan  [address] [facility] [address]    May 13, 2025     Dear Mr. Murphy:    Your lab results look okay.     Resulted Orders   CBC (No Diff)   Result Value Ref Range    WBC 7.84 3.40 - 10.80 10*3/mm3    RBC 4.73 4.14 - 5.80 10*6/mm3    Hemoglobin 15.6 13.0 - 17.7 g/dL    Hematocrit 47.2 37.5 - 51.0 %    MCV 99.8 (H) 79.0 - 97.0 fL    MCH 33.0 26.6 - 33.0 pg    MCHC 33.1 31.5 - 35.7 g/dL    RDW 13.1 12.3 - 15.4 %    RDW-SD 49.5 37.0 - 54.0 fl    MPV 10.4 6.0 - 12.0 fL    Platelets 196 140 - 450 10*3/mm3   Comprehensive Metabolic Panel   Result Value Ref Range    Glucose 155 (H) 65 - 99 mg/dL    BUN 17 8 - 23 mg/dL    Creatinine 1.11 0.76 - 1.27 mg/dL    Sodium 136 136 - 145 mmol/L    Potassium 4.7 3.5 - 5.2 mmol/L    Chloride 100 98 - 107 mmol/L    CO2 24.7 22.0 - 29.0 mmol/L    Calcium 9.3 8.6 - 10.5 mg/dL    Total Protein 6.9 6.0 - 8.5 g/dL    Albumin 4.3 3.5 - 5.2 g/dL    ALT (SGPT) 16 1 - 41 U/L    AST (SGOT) 18 1 - 40 U/L    Alkaline Phosphatase 72 39 - 117 U/L    Total Bilirubin 0.4 0.0 - 1.2 mg/dL    Globulin 2.6 gm/dL    A/G Ratio 1.7 g/dL    BUN/Creatinine Ratio 15.3 7.0 - 25.0    Anion Gap 11.3 5.0 - 15.0 mmol/L    eGFR 74.2 >60.0 mL/min/1.73   Hemoglobin A1c   Result Value Ref Range    Hemoglobin A1C 6.60 (H) 4.80 - 5.60 %   Iron Profile   Result Value Ref Range    Iron 101 59 - 158 mcg/dL    Iron Saturation (TSAT) 27 20 - 50 %    Transferrin 254 200 - 360 mg/dL    TIBC 378 298 - 536 mcg/dL   Lipid Panel   Result Value Ref Range    Total Cholesterol 170 0 - 200 mg/dL    Triglycerides 189 (H) 0 - 150 mg/dL    HDL Cholesterol 47 40 - 60 mg/dL    LDL Cholesterol  91 0 - 100 mg/dL    VLDL Cholesterol 32 5 - 40 mg/dL    LDL/HDL Ratio 1.81    PSA Screen   Result Value Ref Range    PSA 3.710 0.000 - 4.000 ng/mL   TSH Rfx On Abnormal To Free T4   Result Value Ref Range    TSH 1.450 0.270 - 4.200 uIU/mL              Sincerely,        Kip Mena MD

## 2025-06-19 DIAGNOSIS — I50.22 CHRONIC HFREF (HEART FAILURE WITH REDUCED EJECTION FRACTION): Primary | ICD-10-CM

## 2025-07-01 ENCOUNTER — OFFICE VISIT (OUTPATIENT)
Age: 65
End: 2025-07-01
Payer: COMMERCIAL

## 2025-07-01 ENCOUNTER — HOSPITAL ENCOUNTER (OUTPATIENT)
Dept: CARDIOLOGY | Facility: HOSPITAL | Age: 65
Discharge: HOME | End: 2025-07-01
Admitting: NURSE PRACTITIONER
Payer: COMMERCIAL

## 2025-07-01 VITALS
OXYGEN SATURATION: 96 % | SYSTOLIC BLOOD PRESSURE: 130 MMHG | DIASTOLIC BLOOD PRESSURE: 80 MMHG | BODY MASS INDEX: 34.67 KG/M2 | HEART RATE: 62 BPM | WEIGHT: 256 LBS | HEIGHT: 72 IN

## 2025-07-01 VITALS
HEIGHT: 72 IN | SYSTOLIC BLOOD PRESSURE: 138 MMHG | WEIGHT: 254 LBS | DIASTOLIC BLOOD PRESSURE: 58 MMHG | BODY MASS INDEX: 34.4 KG/M2 | HEART RATE: 64 BPM

## 2025-07-01 DIAGNOSIS — I50.22 CHRONIC HFREF (HEART FAILURE WITH REDUCED EJECTION FRACTION): Primary | ICD-10-CM

## 2025-07-01 DIAGNOSIS — I50.22 CHRONIC HFREF (HEART FAILURE WITH REDUCED EJECTION FRACTION): ICD-10-CM

## 2025-07-01 DIAGNOSIS — I10 ESSENTIAL HYPERTENSION: ICD-10-CM

## 2025-07-01 DIAGNOSIS — I51.3 MURAL THROMBUS OF LEFT VENTRICLE: ICD-10-CM

## 2025-07-01 DIAGNOSIS — I25.10 CORONARY ARTERY DISEASE INVOLVING NATIVE CORONARY ARTERY OF NATIVE HEART WITHOUT ANGINA PECTORIS: ICD-10-CM

## 2025-07-01 LAB
AORTIC ARCH: 2.6 CM
AORTIC DIMENSIONLESS INDEX: 1 (DI)
ASCENDING AORTA: 3.5 CM
AV MEAN PRESS GRAD SYS DOP V1V2: 1.77 MMHG
AV VMAX SYS DOP: 86.3 CM/SEC
BH CV ECHO LEFT VENTRICLE GLOBAL LONGITUDINAL STRAIN: -15.6 %
BH CV ECHO MEAS - ACS: 2.03 CM
BH CV ECHO MEAS - AO MAX PG: 3 MMHG
BH CV ECHO MEAS - AO ROOT AREA (BSA CORRECTED): 1.5 CM2
BH CV ECHO MEAS - AO ROOT DIAM: 3.6 CM
BH CV ECHO MEAS - AO V2 VTI: 19.2 CM
BH CV ECHO MEAS - AVA(I,D): 3.9 CM2
BH CV ECHO MEAS - EDV(CUBED): 168.4 ML
BH CV ECHO MEAS - EDV(MOD-SP2): 155 ML
BH CV ECHO MEAS - EDV(MOD-SP4): 190 ML
BH CV ECHO MEAS - EF(MOD-SP2): 43.2 %
BH CV ECHO MEAS - EF(MOD-SP4): 52.1 %
BH CV ECHO MEAS - ESV(CUBED): 43.8 ML
BH CV ECHO MEAS - ESV(MOD-SP2): 88 ML
BH CV ECHO MEAS - ESV(MOD-SP4): 91 ML
BH CV ECHO MEAS - FS: 36.2 %
BH CV ECHO MEAS - IVS/LVPW: 0.99 CM
BH CV ECHO MEAS - IVSD: 1.07 CM
BH CV ECHO MEAS - LAT PEAK E' VEL: 9.1 CM/SEC
BH CV ECHO MEAS - LV DIASTOLIC VOL/BSA (35-75): 80.6 CM2
BH CV ECHO MEAS - LV MASS(C)D: 235.6 GRAMS
BH CV ECHO MEAS - LV MAX PG: 3.4 MMHG
BH CV ECHO MEAS - LV MEAN PG: 1.58 MMHG
BH CV ECHO MEAS - LV SYSTOLIC VOL/BSA (12-30): 38.6 CM2
BH CV ECHO MEAS - LV V1 MAX: 92.1 CM/SEC
BH CV ECHO MEAS - LV V1 VTI: 19.3 CM
BH CV ECHO MEAS - LVIDD: 5.5 CM
BH CV ECHO MEAS - LVIDS: 3.5 CM
BH CV ECHO MEAS - LVOT AREA: 3.8 CM2
BH CV ECHO MEAS - LVOT DIAM: 2.21 CM
BH CV ECHO MEAS - LVPWD: 1.08 CM
BH CV ECHO MEAS - MED PEAK E' VEL: 10.3 CM/SEC
BH CV ECHO MEAS - MV A DUR: 0.14 SEC
BH CV ECHO MEAS - MV A MAX VEL: 101.5 CM/SEC
BH CV ECHO MEAS - MV DEC SLOPE: 271.1 CM/SEC2
BH CV ECHO MEAS - MV DEC TIME: 0.22 SEC
BH CV ECHO MEAS - MV E MAX VEL: 63 CM/SEC
BH CV ECHO MEAS - MV E/A: 0.62
BH CV ECHO MEAS - MV MAX PG: 3 MMHG
BH CV ECHO MEAS - MV MEAN PG: 1.2 MMHG
BH CV ECHO MEAS - MV P1/2T: 93.2 MSEC
BH CV ECHO MEAS - MV V2 VTI: 29 CM
BH CV ECHO MEAS - MVA(P1/2T): 2.36 CM2
BH CV ECHO MEAS - MVA(VTI): 2.6 CM2
BH CV ECHO MEAS - PA ACC TIME: 0.18 SEC
BH CV ECHO MEAS - PA V2 MAX: 70.6 CM/SEC
BH CV ECHO MEAS - PULM A REVS DUR: 0.14 SEC
BH CV ECHO MEAS - PULM A REVS VEL: 29.6 CM/SEC
BH CV ECHO MEAS - PULM DIAS VEL: 30.1 CM/SEC
BH CV ECHO MEAS - PULM S/D: 1.28
BH CV ECHO MEAS - PULM SYS VEL: 38.3 CM/SEC
BH CV ECHO MEAS - QP/QS: 0.77
BH CV ECHO MEAS - RV MAX PG: 0.99 MMHG
BH CV ECHO MEAS - RV V1 MAX: 49.7 CM/SEC
BH CV ECHO MEAS - RV V1 VTI: 13.1 CM
BH CV ECHO MEAS - RVOT DIAM: 2.35 CM
BH CV ECHO MEAS - SV(LVOT): 74.2 ML
BH CV ECHO MEAS - SV(MOD-SP2): 67 ML
BH CV ECHO MEAS - SV(MOD-SP4): 99 ML
BH CV ECHO MEAS - SV(RVOT): 57.2 ML
BH CV ECHO MEAS - SVI(LVOT): 31.5 ML/M2
BH CV ECHO MEAS - SVI(MOD-SP2): 28.4 ML/M2
BH CV ECHO MEAS - SVI(MOD-SP4): 42 ML/M2
BH CV ECHO MEAS - TAPSE (>1.6): 2.33 CM
BH CV ECHO MEASUREMENTS AVERAGE E/E' RATIO: 6.49
BH CV XLRA - RV BASE: 3 CM
BH CV XLRA - RV LENGTH: 7.8 CM
BH CV XLRA - RV MID: 2.21 CM
BH CV XLRA - TDI S': 13.9 CM/SEC
LEFT ATRIUM VOLUME INDEX: 33.2 ML/M2
LV EF BIPLANE MOD: 46 %
SINUS: 3.6 CM
STJ: 3.3 CM

## 2025-07-01 PROCEDURE — 93356 MYOCRD STRAIN IMG SPCKL TRCK: CPT

## 2025-07-01 PROCEDURE — 93306 TTE W/DOPPLER COMPLETE: CPT

## 2025-07-01 PROCEDURE — 25510000001 PERFLUTREN 6.52 MG/ML SUSPENSION 2 ML VIAL: Performed by: NURSE PRACTITIONER

## 2025-07-01 RX ORDER — LOSARTAN POTASSIUM 100 MG/1
100 TABLET ORAL DAILY
Qty: 90 TABLET | Refills: 3 | Status: SHIPPED | OUTPATIENT
Start: 2025-07-01

## 2025-07-01 RX ORDER — SPIRONOLACTONE 25 MG/1
25 TABLET ORAL DAILY
Qty: 90 TABLET | Refills: 3 | Status: SHIPPED | OUTPATIENT
Start: 2025-07-01

## 2025-07-01 RX ADMIN — PERFLUTREN 2 ML: 6.52 INJECTION, SUSPENSION INTRAVENOUS at 07:38

## 2025-07-01 NOTE — PROGRESS NOTES
Subjective:     Encounter Date:07/1/2025      Patient ID: Marquise Murphy Jr. is a 64 y.o. male.    Chief Complaint:  Follow-up CAD, HFrEF    HPI:   64 y.o. male  with hypertension, hyperlipidemia, hearing loss, anterior STEMI in August 2024 status post PCI to proximal LAD, HFrEF (EF 36 to 40%) who presents for follow-up.  Patient was last seen in the office by Mely in March and at that time echocardiogram had revealed an apical thrombus so he was started on Eliquis.  He was also started back on spironolactone and presents today for follow-up.  Echocardiogram this morning revealed stable EF of about 40 to 45% with a small mural thrombus in the apex that is smaller than prior.  He feels well and denies any dyspnea, chest pressure, lower extremity edema.     Per prior note:  Patient had an anterior STEMI in late August and received successful IVUS guided PCI with a 3.0 x 38mm Xience Skypoint drug-eluting stent (postdilated proximally with a 4.0 mm NC and distally with a 3.0 mm NC.) Left ventriculography revealed mid to distal anterior wall and apical hypokinesis.  Echocardiogram revealed an EF of 35 to 40% with hypokinesis of the mid anterior wall and akinesis of the apex.  He followed up with Mely Caceres in mid September and was started on spironolactone.  Unfortunately this led to hypotension with some associated dizziness so he stopped this.  He has been going to cardiac rehab and has been improving each time.  He currently denies any chest pain or dyspnea on exertion.       The following portions of the patient's history were reviewed and updated as appropriate: allergies, current medications, past family history, past medical history, past social history, past surgical history and problem list.     REVIEW OF SYSTEMS:   All systems reviewed.  Pertinent positives identified in HPI.  All other systems are negative.    Past Medical History:   Diagnosis Date    Asthma FOR YEARS    HARD TO BREATH THRU NOSE    HL  (hearing loss) FOR YEARS IN RIGHT EAR    GOES IN AND OUT    Visual impairment        Family History   Problem Relation Age of Onset    Diabetes Mother     Heart disease Father        Social History     Socioeconomic History    Marital status: Single   Tobacco Use    Smoking status: Former     Current packs/day: 0.00     Average packs/day: 0.5 packs/day for 42.0 years (21.0 ttl pk-yrs)     Types: Cigarettes     Start date: 10/1/1981     Quit date: 10/2023     Years since quittin.7     Passive exposure: Past    Smokeless tobacco: Never   Vaping Use    Vaping status: Never Used   Substance and Sexual Activity    Alcohol use: Not Currently    Drug use: Never    Sexual activity: Not Currently     Partners: Female       No Known Allergies    Past Surgical History:   Procedure Laterality Date    CARDIAC CATHETERIZATION N/A 2024    Procedure: Left Heart Cath;  Surgeon: Dillon Collazo MD;  Location:  [facility];  Service: Cardiovascular;  Laterality: N/A;    CARDIAC CATHETERIZATION N/A 2024    Procedure: Coronary angiography;  Surgeon: Dillon Collazo MD;  Location:  [facility];  Service: Cardiovascular;  Laterality: N/A;    CARDIAC CATHETERIZATION N/A 2024    Procedure: Stent KATHY coronary;  Surgeon: Dillon Collazo MD;  Location:  [facility];  Service: Cardiovascular;  Laterality: N/A;    CARDIAC CATHETERIZATION N/A 2024    Procedure: Percutaneous Coronary Intervention;  Surgeon: Dillon Collazo MD;  Location:  [facility];  Service: Cardiovascular;  Laterality: N/A;    CARDIAC CATHETERIZATION N/A 2024    Procedure: Left ventriculography;  Surgeon: Dillon Collazo MD;  [facility];  Service: Cardiovascular;  Laterality: N/A;    COLONOSCOPY      POLYPS    EYE ENUCLEATION Right     FROM TRAUMA    FACIAL FRACTURE SURGERY      INTERVENTIONAL RADIOLOGY PROCEDURE N/A 2024    Procedure: Intravascular Ultrasound;   Surgeon: Dillon Collazo MD;  Location:  [facility];  Service: Cardiovascular;  Laterality: N/A;    TONSILLECTOMY         Procedures       Objective:         Vitals:    07/01/25 1125   BP: 130/80   Pulse: 62   SpO2: 96%       PHYSICAL EXAM:  GEN: well appearing, in NAD   HEENT: NCAT, EOMI, moist mucus membranes   Respiratory: CTAB, no wheezes, rales or rhonchi  CV: normal rate, regular rhythm, normal S1, S2, no murmurs, rubs, gallops, +2 radial pulses b/l  GI: soft, nontender, nondistended  MSK: no edema  Skin: no rash, warm, dry  Heme/Lymph: no bruising or bleeding  Neuro: Alert and Oriented x 3, grossly normal motor function        Assessment:         (I50.22) Chronic HFrEF (heart failure with reduced ejection fraction)    (I25.10) Coronary artery disease (stent)    (I10) Essential hypertension    (I51.3) Mural thrombus of left ventricle    64 y.o. male  with hypertension, hyperlipidemia, hearing loss, anterior STEMI in August 2024 status post PCI to proximal LAD, HFrEF (EF 36 to 40%) who presents for follow-up.      Plan:       #HFrEF, stage C, NYHA I  EF 40 to 45%.  Currently asymptomatic.  Currently tolerating spironolactone.  - Increase losartan 100 mg daily  - Continue Toprol 50 mg daily, spironolactone 25 mg daily, Farxiga 10 mg daily    #LV thrombus persistent though improved on echocardiogram today.  -Continue Eliquis 5 mg twice daily    #CAD  As above, anterior STEMI in late August requiring PCI to the proximal LAD with a 3.0 x 38mm Xience Skypoint drug-eluting stent (postdilated proximally with a 4.0 mm NC and distally with a 3.0 mm NC.)  -Continue Plavix 75 mg daily, atorvastatin 40 mg daily    Kip Mena MD, thank you very much for referring this kind patient to me. Please call me with any questions or concerns. I will see the patient again in the office in 6 months or earlier as needed.         Dillon Cortez MD, [facility], [facility]  07/01/25  [facility]    Outpatient Encounter  Medications as of 7/1/2025   Medication Sig Dispense Refill    apixaban (ELIQUIS) 5 MG tablet tablet Take 1 tablet by mouth 2 (Two) Times a Day. 60 tablet 11    atorvastatin (LIPITOR) 40 MG tablet Take 1 tablet by mouth Every Night. 90 tablet 3    clopidogrel (PLAVIX) 75 MG tablet Take 1 tablet by mouth Daily. 90 tablet 3    dapagliflozin Propanediol (Farxiga) 10 MG tablet Take 10 mg by mouth Daily. Do NOT SUBSTITUTE FOR GENERIC 30 tablet 11    fluticasone (FLONASE) 50 MCG/ACT nasal spray 2 sprays into the nostril(s) as directed by provider Daily. Administer 2 sprays in each nostril for each dose. 16 g 6    losartan (Cozaar) 100 MG tablet Take 1 tablet by mouth Daily. 90 tablet 3    metoprolol succinate XL (TOPROL-XL) 50 MG 24 hr tablet Take 1 tablet by mouth Daily. 90 tablet 3    nitroglycerin (NITROSTAT) 0.4 MG SL tablet Place 1 tablet under the tongue Every 5 (Five) Minutes As Needed for Chest Pain (Systolic BP Greater Than 100). Take no more than 3 doses in 15 minutes. 25 tablet 1    spironolactone (ALDACTONE) 25 MG tablet Take 1 tablet by mouth Daily. 90 tablet 3    [DISCONTINUED] losartan (Cozaar) 50 MG tablet Take 1 tablet by mouth Daily. 90 tablet 3    [DISCONTINUED] spironolactone (ALDACTONE) 25 MG tablet Take 0.5 tablets by mouth Daily. 30 tablet 11     Facility-Administered Encounter Medications as of 7/1/2025   Medication Dose Route Frequency Provider Last Rate Last Admin    [COMPLETED] perflutren (DEFINITY) 8.476 mg in Sodium Chloride (PF) 0.9 % 10 mL injection  2 mL Intravenous Once in imaging Mely Caceres APRN   2 mL at 07/01/25 5984

## 2025-08-18 ENCOUNTER — TELEPHONE (OUTPATIENT)
Dept: ADMINISTRATIVE | Facility: OTHER | Age: 65
End: 2025-08-18
Payer: COMMERCIAL

## (undated) DEVICE — CATH GUIDE LAUNCHER EBU3.5 6F 100CM

## (undated) DEVICE — Device: Brand: ASAHI SION BLUE

## (undated) DEVICE — HI-TORQUE BALANCE MIDDLEWEIGHT GUIDE WIRE .014 STRAIGHT TIP 3.0 CM X 190 CM: Brand: HI-TORQUE BALANCE MIDDLEWEIGHT

## (undated) DEVICE — TREK CORONARY DILATATION CATHETER 3.0 MM X 12 MM / RAPID-EXCHANGE: Brand: TREK

## (undated) DEVICE — NC TREK NEO™ CORONARY DILATATION CATHETER 4.00 MM X 12 MM / RAPID-EXCHANGE: Brand: NC TREK NEO™

## (undated) DEVICE — CORONARY IMAGING CATHETER: Brand: OPTICROSS™ HD

## (undated) DEVICE — GLIDESHEATH SLENDER STAINLESS STEEL KIT: Brand: GLIDESHEATH SLENDER

## (undated) DEVICE — DEV INDEFLATOR P/N 580289

## (undated) DEVICE — NC TREK NEO™ CORONARY DILATATION CATHETER 3.00 MM X 15 MM / RAPID-EXCHANGE: Brand: NC TREK NEO™

## (undated) DEVICE — NC TREK NEO™ CORONARY DILATATION CATHETER 4.00 MM X 15 MM / RAPID-EXCHANGE: Brand: NC TREK NEO™

## (undated) DEVICE — KT MANIFLD CARDIAC

## (undated) DEVICE — CATH DIAG CARD PERFORM JR4.0 BT 4F100CM

## (undated) DEVICE — DGW .035 FC J3MM 260CM TEF: Brand: EMERALD

## (undated) DEVICE — PERCLOSE PROGLIDE™ SUTURE-MEDIATED CLOSURE SYSTEM: Brand: PERCLOSE PROGLIDE™

## (undated) DEVICE — CATH F5 INF PIG145 110CM 6SH: Brand: INFINITI

## (undated) DEVICE — THE VASC BAND HEMOSTAT IS A COMPRESSION DEVICE TO ASSIST HEMOSTASIS OF ARTERIAL, VENOUS AND HEMODIALYSIS PERCUTANEOUS ACCESS SITES.: Brand: VASC BAND™ HEMOSTAT

## (undated) DEVICE — PK CATH CARD 40

## (undated) DEVICE — GUIDELINER CATHETERS ARE INTENDED TO BE USED IN CONJUNCTION WITH GUIDE CATHETERS TO ACCESS DISCRETE REGIONS OF THE CORONARY AND/OR PERIPHERAL VASCULATURE, AND TO FACILITATE PLACEMENT OF INTERVENTIONAL DEVICES.: Brand: GUIDELINER® V3 CATHETER

## (undated) DEVICE — PINNACLE INTRODUCER SHEATH: Brand: PINNACLE

## (undated) DEVICE — CATH DIAG IMPULSE FR4 5F 100CM

## (undated) DEVICE — Device